# Patient Record
Sex: FEMALE | Race: WHITE | Employment: OTHER | URBAN - METROPOLITAN AREA
[De-identification: names, ages, dates, MRNs, and addresses within clinical notes are randomized per-mention and may not be internally consistent; named-entity substitution may affect disease eponyms.]

---

## 2018-07-17 DIAGNOSIS — F41.9 ANXIETY: ICD-10-CM

## 2018-07-17 RX ORDER — AMITRIPTYLINE HYDROCHLORIDE 25 MG/1
25 TABLET, FILM COATED ORAL 3 TIMES DAILY
Qty: 90 TABLET | Refills: 0 | Status: SHIPPED | OUTPATIENT
Start: 2018-07-17 | End: 2018-08-03 | Stop reason: SDUPTHER

## 2018-07-17 RX ORDER — BUSPIRONE HYDROCHLORIDE 10 MG/1
10 TABLET ORAL 3 TIMES DAILY
Qty: 90 TABLET | Refills: 0 | Status: SHIPPED | OUTPATIENT
Start: 2018-07-17 | End: 2018-08-03 | Stop reason: SDUPTHER

## 2018-07-17 NOTE — TELEPHONE ENCOUNTER
Patient's daughter contacted office asking for refill of Buspar 10 mg and Elavil 25 mg,patient has an appt scheduled for 8/1/18, advised a 30 day supply can be called in for patient.

## 2018-08-03 ENCOUNTER — OFFICE VISIT (OUTPATIENT)
Dept: FAMILY MEDICINE CLINIC | Age: 78
End: 2018-08-03
Payer: MEDICARE

## 2018-08-03 ENCOUNTER — HOSPITAL ENCOUNTER (OUTPATIENT)
Age: 78
Discharge: HOME | End: 2018-08-05
Payer: MEDICARE

## 2018-08-03 VITALS
DIASTOLIC BLOOD PRESSURE: 70 MMHG | RESPIRATION RATE: 18 BRPM | OXYGEN SATURATION: 97 % | SYSTOLIC BLOOD PRESSURE: 126 MMHG | BODY MASS INDEX: 23.46 KG/M2 | WEIGHT: 140.8 LBS | HEART RATE: 74 BPM | HEIGHT: 65 IN

## 2018-08-03 DIAGNOSIS — R53.83 FATIGUE, UNSPECIFIED TYPE: ICD-10-CM

## 2018-08-03 DIAGNOSIS — E78.2 MIXED HYPERLIPIDEMIA: ICD-10-CM

## 2018-08-03 DIAGNOSIS — R73.01 IFG (IMPAIRED FASTING GLUCOSE): ICD-10-CM

## 2018-08-03 DIAGNOSIS — Z00.00 ROUTINE GENERAL MEDICAL EXAMINATION AT A HEALTH CARE FACILITY: ICD-10-CM

## 2018-08-03 DIAGNOSIS — R41.3 MEMORY CHANGE: ICD-10-CM

## 2018-08-03 DIAGNOSIS — I10 ESSENTIAL HYPERTENSION: ICD-10-CM

## 2018-08-03 DIAGNOSIS — E03.9 ACQUIRED HYPOTHYROIDISM: ICD-10-CM

## 2018-08-03 DIAGNOSIS — Z00.00 MEDICARE ANNUAL WELLNESS VISIT, INITIAL: Primary | ICD-10-CM

## 2018-08-03 DIAGNOSIS — Z00.00 MEDICARE ANNUAL WELLNESS VISIT, INITIAL: ICD-10-CM

## 2018-08-03 DIAGNOSIS — F41.9 ANXIETY: ICD-10-CM

## 2018-08-03 LAB
ALBUMIN SERPL-MCNC: 4.5 G/DL (ref 3.5–5.2)
ALP BLD-CCNC: 90 U/L (ref 35–104)
ALT SERPL-CCNC: 10 U/L (ref 0–32)
ANION GAP SERPL CALCULATED.3IONS-SCNC: 13 MMOL/L (ref 7–16)
AST SERPL-CCNC: 24 U/L (ref 0–31)
BASOPHILS ABSOLUTE: 0.06 E9/L (ref 0–0.2)
BASOPHILS RELATIVE PERCENT: 0.8 % (ref 0–2)
BILIRUB SERPL-MCNC: 0.3 MG/DL (ref 0–1.2)
BUN BLDV-MCNC: 14 MG/DL (ref 8–23)
CALCIUM SERPL-MCNC: 10.3 MG/DL (ref 8.6–10.2)
CHLORIDE BLD-SCNC: 100 MMOL/L (ref 98–107)
CHOLESTEROL, TOTAL: 151 MG/DL (ref 0–199)
CO2: 28 MMOL/L (ref 22–29)
CREAT SERPL-MCNC: 0.7 MG/DL (ref 0.5–1)
EOSINOPHILS ABSOLUTE: 0.17 E9/L (ref 0.05–0.5)
EOSINOPHILS RELATIVE PERCENT: 2.2 % (ref 0–6)
GFR AFRICAN AMERICAN: >60
GFR NON-AFRICAN AMERICAN: >60 ML/MIN/1.73
GLUCOSE BLD-MCNC: 86 MG/DL (ref 74–109)
HBA1C MFR BLD: 5.7 % (ref 4–5.6)
HCT VFR BLD CALC: 40.2 % (ref 34–48)
HDLC SERPL-MCNC: 62 MG/DL
HEMOGLOBIN: 12.5 G/DL (ref 11.5–15.5)
IMMATURE GRANULOCYTES #: 0.02 E9/L
IMMATURE GRANULOCYTES %: 0.3 % (ref 0–5)
LDL CHOLESTEROL CALCULATED: 65 MG/DL (ref 0–99)
LYMPHOCYTES ABSOLUTE: 1.54 E9/L (ref 1.5–4)
LYMPHOCYTES RELATIVE PERCENT: 20.2 % (ref 20–42)
MCH RBC QN AUTO: 31 PG (ref 26–35)
MCHC RBC AUTO-ENTMCNC: 31.1 % (ref 32–34.5)
MCV RBC AUTO: 99.8 FL (ref 80–99.9)
MONOCYTES ABSOLUTE: 0.54 E9/L (ref 0.1–0.95)
MONOCYTES RELATIVE PERCENT: 7.1 % (ref 2–12)
NEUTROPHILS ABSOLUTE: 5.29 E9/L (ref 1.8–7.3)
NEUTROPHILS RELATIVE PERCENT: 69.4 % (ref 43–80)
PDW BLD-RTO: 12.6 FL (ref 11.5–15)
PLATELET # BLD: 236 E9/L (ref 130–450)
PMV BLD AUTO: 10.7 FL (ref 7–12)
POTASSIUM SERPL-SCNC: 4.5 MMOL/L (ref 3.5–5)
RBC # BLD: 4.03 E12/L (ref 3.5–5.5)
SODIUM BLD-SCNC: 141 MMOL/L (ref 132–146)
TOTAL PROTEIN: 7.1 G/DL (ref 6.4–8.3)
TRIGL SERPL-MCNC: 122 MG/DL (ref 0–149)
TSH SERPL DL<=0.05 MIU/L-ACNC: 2.65 UIU/ML (ref 0.27–4.2)
VLDLC SERPL CALC-MCNC: 24 MG/DL
WBC # BLD: 7.6 E9/L (ref 4.5–11.5)

## 2018-08-03 PROCEDURE — 85025 COMPLETE CBC W/AUTO DIFF WBC: CPT

## 2018-08-03 PROCEDURE — G0438 PPPS, INITIAL VISIT: HCPCS | Performed by: FAMILY MEDICINE

## 2018-08-03 PROCEDURE — 83036 HEMOGLOBIN GLYCOSYLATED A1C: CPT

## 2018-08-03 PROCEDURE — 80053 COMPREHEN METABOLIC PANEL: CPT

## 2018-08-03 PROCEDURE — 80061 LIPID PANEL: CPT

## 2018-08-03 PROCEDURE — 84443 ASSAY THYROID STIM HORMONE: CPT

## 2018-08-03 RX ORDER — AMITRIPTYLINE HYDROCHLORIDE 25 MG/1
25 TABLET, FILM COATED ORAL 3 TIMES DAILY
Qty: 270 TABLET | Refills: 1 | Status: SHIPPED | OUTPATIENT
Start: 2018-08-03 | End: 2018-08-20 | Stop reason: SDUPTHER

## 2018-08-03 RX ORDER — SPIRONOLACTONE 25 MG/1
TABLET ORAL
Qty: 90 TABLET | Refills: 1 | Status: SHIPPED | OUTPATIENT
Start: 2018-08-03 | End: 2018-08-20 | Stop reason: SDUPTHER

## 2018-08-03 RX ORDER — SIMVASTATIN 40 MG
40 TABLET ORAL NIGHTLY
Qty: 90 TABLET | Refills: 1 | Status: SHIPPED | OUTPATIENT
Start: 2018-08-03 | End: 2018-08-20 | Stop reason: SDUPTHER

## 2018-08-03 RX ORDER — BUSPIRONE HYDROCHLORIDE 10 MG/1
10 TABLET ORAL 3 TIMES DAILY
Qty: 270 TABLET | Refills: 1 | Status: SHIPPED | OUTPATIENT
Start: 2018-08-03 | End: 2018-08-20 | Stop reason: SDUPTHER

## 2018-08-03 RX ORDER — DONEPEZIL HYDROCHLORIDE 5 MG/1
5 TABLET, FILM COATED ORAL NIGHTLY
Qty: 90 TABLET | Refills: 1 | Status: SHIPPED | OUTPATIENT
Start: 2018-08-03 | End: 2018-08-20 | Stop reason: SDUPTHER

## 2018-08-03 RX ORDER — LEVOTHYROXINE SODIUM 0.07 MG/1
TABLET ORAL
Qty: 90 TABLET | Refills: 1 | Status: SHIPPED | OUTPATIENT
Start: 2018-08-03 | End: 2018-08-20 | Stop reason: SDUPTHER

## 2018-08-03 ASSESSMENT — LIFESTYLE VARIABLES
HOW OFTEN DURING THE LAST YEAR HAVE YOU FOUND THAT YOU WERE NOT ABLE TO STOP DRINKING ONCE YOU HAD STARTED: 0
HOW OFTEN DURING THE LAST YEAR HAVE YOU NEEDED AN ALCOHOLIC DRINK FIRST THING IN THE MORNING TO GET YOURSELF GOING AFTER A NIGHT OF HEAVY DRINKING: 0
HOW MANY STANDARD DRINKS CONTAINING ALCOHOL DO YOU HAVE ON A TYPICAL DAY: 0
HOW OFTEN DURING THE LAST YEAR HAVE YOU HAD A FEELING OF GUILT OR REMORSE AFTER DRINKING: 0
HOW OFTEN DURING THE LAST YEAR HAVE YOU FAILED TO DO WHAT WAS NORMALLY EXPECTED FROM YOU BECAUSE OF DRINKING: 0
HAVE YOU OR SOMEONE ELSE BEEN INJURED AS A RESULT OF YOUR DRINKING: 0
HOW OFTEN DURING THE LAST YEAR HAVE YOU BEEN UNABLE TO REMEMBER WHAT HAPPENED THE NIGHT BEFORE BECAUSE YOU HAD BEEN DRINKING: 0
AUDIT-C TOTAL SCORE: 2
HAS A RELATIVE, FRIEND, DOCTOR, OR ANOTHER HEALTH PROFESSIONAL EXPRESSED CONCERN ABOUT YOUR DRINKING OR SUGGESTED YOU CUT DOWN: 0
AUDIT TOTAL SCORE: 2
HOW OFTEN DO YOU HAVE A DRINK CONTAINING ALCOHOL: 1
HOW OFTEN DO YOU HAVE SIX OR MORE DRINKS ON ONE OCCASION: 1

## 2018-08-03 ASSESSMENT — ANXIETY QUESTIONNAIRES: GAD7 TOTAL SCORE: 2

## 2018-08-03 ASSESSMENT — PATIENT HEALTH QUESTIONNAIRE - PHQ9: SUM OF ALL RESPONSES TO PHQ QUESTIONS 1-9: 2

## 2018-08-03 NOTE — PROGRESS NOTES
Medicare Annual Wellness Visit  Name: Ray Hwang Date: 8/3/2018   MRN: <[id number]> Sex: Female   Age: 70 y.o. Ethnicity: Non-/Non    : 1940 Race: Caren Piper is here for Medicare AWV (AWV) and Medication Refill (Pt here today for medication refills- meds pended)    Screenings for behavioral, psychosocial and functional/safety risks, and cognitive dysfunction are all negative except as indicated below. These results, as well as other patient data from the [address] form, are documented in Flowsheets linked to this Encounter. No Known Allergies  Prior to Visit Medications    Medication Sig Taking? Authorizing Provider   amitriptyline (ELAVIL) 25 MG tablet Take 1 tablet by mouth 3 times daily Yes Dexter Mitchell Catterlin, DO   busPIRone (BUSPAR) 10 MG tablet Take 1 tablet by mouth 3 times daily Yes Dexter Liao, DO   donepezil (ARICEPT) 5 MG tablet Take 1 tablet by mouth nightly Yes Dexter Liao, DO   levothyroxine (SYNTHROID) 75 MCG tablet TAKE 1 TABLET BY MOUTH EVERY DAY Yes Dexter Liao, DO   simvastatin (ZOCOR) 40 MG tablet Take 1 tablet by mouth nightly Yes Rip Liao DO   spironolactone (ALDACTONE) 25 MG tablet TAKE 1 TABLET BY MOUTH EVERY DAY Yes Rip Liao, DO   Calcium Carbonate-Vitamin D (CALCIUM-VITAMIN D) 500-200 MG-UNIT per tablet Take 1 tablet by mouth daily Yes Historical Provider, MD   polycarbophil (FIBER-CAPS) 625 MG tablet Take 625 mg by mouth daily Yes Historical Provider, MD   vitamin E 1000 units capsule Take 1,000 Units by mouth daily Yes Historical Provider, MD   Diphenhydramine-APAP, sleep, (TYLENOL PM EXTRA STRENGTH PO) Take 2 tablets by mouth nightly as needed Yes Historical Provider, MD   Probiotic Product (PROBIOTIC DAILY PO) Take by mouth Yes Historical Provider, MD   aspirin EC 81 MG EC tablet Take 1 tablet by mouth daily.  Yes Faye Manitlla DO     Past Medical History: free of clutter?: Yes  Do you always fasten your seatbelt when you are in a car?: Yes  Safety Interventions:  · Home safety tips provided    ADL:  ADLs  In the past 7 days, did you need help from others to perform any of the following everyday activities?: None  In the past 7 days, did you need help from others to take care of any of the following?: (!) Banking/Finances, Transportation, Taking Medications  ADL Interventions:  · None indicated    Personalized Preventive Plan   Current Health Maintenance Status  Immunization History   Administered Date(s) Administered    Influenza, High Dose 10/21/2014, 10/19/2015    Influenza, Intradermal, Preservative free 11/29/2017    Influenza, Quadv, 3 yrs and older, IM, Preservative Free 11/04/2016    Pneumococcal 13-valent Conjugate ([id number]) 09/02/2015    Pneumococcal Polysaccharide ([id number]) 06/06/2014    Tdap (Boostrix, Adacel) 05/18/2016        Health Maintenance   Topic Date Due    Shingles Vaccine (1 of 2 - 2 Dose Series) 05/03/1992    Flu vaccine (1) 09/01/2018    TSH testing  11/29/2018    Potassium monitoring  11/29/2018    Creatinine monitoring  11/29/2018    DTaP/Tdap/Td vaccine (2 - Td) 05/18/2026    DEXA (modify frequency per FRAX score)  Addressed    Pneumococcal low/med risk  Completed     Recommendations for Preventive Services Due: see orders.   Recommended screening schedule for the next 5-10 years is provided to the patient in written form: see Patient Instructions/AVS.

## 2018-08-20 DIAGNOSIS — I10 ESSENTIAL HYPERTENSION: ICD-10-CM

## 2018-08-20 DIAGNOSIS — F41.9 ANXIETY: ICD-10-CM

## 2018-08-20 DIAGNOSIS — R41.3 MEMORY CHANGE: ICD-10-CM

## 2018-08-20 DIAGNOSIS — E78.2 MIXED HYPERLIPIDEMIA: ICD-10-CM

## 2018-08-20 DIAGNOSIS — E03.9 ACQUIRED HYPOTHYROIDISM: ICD-10-CM

## 2018-08-20 RX ORDER — SPIRONOLACTONE 25 MG/1
TABLET ORAL
Qty: 90 TABLET | Refills: 1 | Status: SHIPPED | OUTPATIENT
Start: 2018-08-20 | End: 2018-08-27

## 2018-08-20 RX ORDER — DONEPEZIL HYDROCHLORIDE 5 MG/1
5 TABLET, FILM COATED ORAL NIGHTLY
Qty: 90 TABLET | Refills: 1 | Status: SHIPPED | OUTPATIENT
Start: 2018-08-20 | End: 2018-08-27

## 2018-08-20 RX ORDER — SIMVASTATIN 40 MG
40 TABLET ORAL NIGHTLY
Qty: 90 TABLET | Refills: 1 | Status: SHIPPED | OUTPATIENT
Start: 2018-08-20 | End: 2018-08-27

## 2018-08-20 RX ORDER — BUSPIRONE HYDROCHLORIDE 10 MG/1
10 TABLET ORAL 3 TIMES DAILY
Qty: 270 TABLET | Refills: 1 | Status: SHIPPED | OUTPATIENT
Start: 2018-08-20 | End: 2019-02-14 | Stop reason: SDUPTHER

## 2018-08-20 RX ORDER — AMITRIPTYLINE HYDROCHLORIDE 25 MG/1
25 TABLET, FILM COATED ORAL 3 TIMES DAILY
Qty: 270 TABLET | Refills: 1 | Status: SHIPPED | OUTPATIENT
Start: 2018-08-20 | End: 2019-04-23 | Stop reason: SDUPTHER

## 2018-08-20 RX ORDER — LEVOTHYROXINE SODIUM 0.07 MG/1
TABLET ORAL
Qty: 90 TABLET | Refills: 1 | Status: SHIPPED | OUTPATIENT
Start: 2018-08-20 | End: 2018-08-27

## 2018-10-26 ENCOUNTER — NURSE ONLY (OUTPATIENT)
Dept: FAMILY MEDICINE CLINIC | Age: 78
End: 2018-10-26
Payer: MEDICARE

## 2018-10-26 DIAGNOSIS — Z23 NEED FOR PROPHYLACTIC VACCINATION AND INOCULATION AGAINST INFLUENZA: Primary | ICD-10-CM

## 2018-10-26 PROCEDURE — G0008 ADMIN INFLUENZA VIRUS VAC: HCPCS | Performed by: FAMILY MEDICINE

## 2018-10-26 PROCEDURE — 90662 IIV NO PRSV INCREASED AG IM: CPT | Performed by: FAMILY MEDICINE

## 2019-02-14 DIAGNOSIS — F41.9 ANXIETY: ICD-10-CM

## 2019-02-14 RX ORDER — BUSPIRONE HYDROCHLORIDE 10 MG/1
TABLET ORAL
Qty: 270 TABLET | Refills: 0 | Status: SHIPPED | OUTPATIENT
Start: 2019-02-14 | End: 2019-04-23 | Stop reason: SDUPTHER

## 2019-04-23 ENCOUNTER — OFFICE VISIT (OUTPATIENT)
Dept: FAMILY MEDICINE CLINIC | Age: 79
End: 2019-04-23
Payer: MEDICARE

## 2019-04-23 ENCOUNTER — HOSPITAL ENCOUNTER (OUTPATIENT)
Age: 79
Discharge: HOME | End: 2019-04-25
Payer: MEDICARE

## 2019-04-23 VITALS
OXYGEN SATURATION: 98 % | DIASTOLIC BLOOD PRESSURE: 66 MMHG | HEART RATE: 97 BPM | WEIGHT: 137.6 LBS | SYSTOLIC BLOOD PRESSURE: 118 MMHG | BODY MASS INDEX: 22.92 KG/M2 | HEIGHT: 65 IN

## 2019-04-23 DIAGNOSIS — F41.9 ANXIETY: ICD-10-CM

## 2019-04-23 DIAGNOSIS — E03.9 ACQUIRED HYPOTHYROIDISM: ICD-10-CM

## 2019-04-23 DIAGNOSIS — R41.3 MEMORY CHANGE: ICD-10-CM

## 2019-04-23 DIAGNOSIS — I10 ESSENTIAL HYPERTENSION: ICD-10-CM

## 2019-04-23 LAB
ALBUMIN SERPL-MCNC: 4.8 G/DL (ref 3.5–5.2)
ALP BLD-CCNC: 103 U/L (ref 35–104)
ALT SERPL-CCNC: 10 U/L (ref 0–32)
ANION GAP SERPL CALCULATED.3IONS-SCNC: 11 MMOL/L (ref 7–16)
AST SERPL-CCNC: 21 U/L (ref 0–31)
BILIRUB SERPL-MCNC: 0.4 MG/DL (ref 0–1.2)
BUN BLDV-MCNC: 14 MG/DL (ref 8–23)
CALCIUM SERPL-MCNC: 10.4 MG/DL (ref 8.6–10.2)
CHLORIDE BLD-SCNC: 103 MMOL/L (ref 98–107)
CO2: 27 MMOL/L (ref 22–29)
CREAT SERPL-MCNC: 0.7 MG/DL (ref 0.5–1)
GFR AFRICAN AMERICAN: >60
GFR NON-AFRICAN AMERICAN: >60 ML/MIN/1.73
GLUCOSE BLD-MCNC: 92 MG/DL (ref 74–99)
POTASSIUM SERPL-SCNC: 4.7 MMOL/L (ref 3.5–5)
SODIUM BLD-SCNC: 141 MMOL/L (ref 132–146)
TOTAL PROTEIN: 7.4 G/DL (ref 6.4–8.3)
TSH SERPL DL<=0.05 MIU/L-ACNC: 2.3 UIU/ML (ref 0.27–4.2)

## 2019-04-23 PROCEDURE — 84443 ASSAY THYROID STIM HORMONE: CPT

## 2019-04-23 PROCEDURE — 80053 COMPREHEN METABOLIC PANEL: CPT

## 2019-04-23 PROCEDURE — 99214 OFFICE O/P EST MOD 30 MIN: CPT | Performed by: FAMILY MEDICINE

## 2019-04-23 RX ORDER — BUSPIRONE HYDROCHLORIDE 10 MG/1
TABLET ORAL
Qty: 270 TABLET | Refills: 1 | Status: SHIPPED | OUTPATIENT
Start: 2019-04-23 | End: 2019-10-15 | Stop reason: SDUPTHER

## 2019-04-23 RX ORDER — LEVOTHYROXINE SODIUM 0.07 MG/1
TABLET ORAL
Qty: 90 TABLET | Refills: 1 | Status: SHIPPED | OUTPATIENT
Start: 2019-04-23 | End: 2019-10-15 | Stop reason: SDUPTHER

## 2019-04-23 RX ORDER — AMITRIPTYLINE HYDROCHLORIDE 25 MG/1
25 TABLET, FILM COATED ORAL 3 TIMES DAILY
Qty: 270 TABLET | Refills: 1 | Status: SHIPPED | OUTPATIENT
Start: 2019-04-23 | End: 2019-10-15 | Stop reason: SDUPTHER

## 2019-04-23 RX ORDER — SPIRONOLACTONE 25 MG/1
TABLET ORAL
Qty: 90 TABLET | Refills: 1 | Status: SHIPPED | OUTPATIENT
Start: 2019-04-23 | End: 2019-10-15 | Stop reason: SDUPTHER

## 2019-04-23 RX ORDER — DONEPEZIL HYDROCHLORIDE 10 MG/1
10 TABLET, FILM COATED ORAL NIGHTLY
Qty: 30 TABLET | Refills: 1 | Status: SHIPPED | OUTPATIENT
Start: 2019-04-23 | End: 2019-05-17 | Stop reason: SDUPTHER

## 2019-04-23 RX ORDER — SIMVASTATIN 40 MG
40 TABLET ORAL NIGHTLY
Qty: 90 TABLET | Refills: 1 | Status: SHIPPED | OUTPATIENT
Start: 2019-04-23 | End: 2019-10-15 | Stop reason: SDUPTHER

## 2019-04-23 ASSESSMENT — PATIENT HEALTH QUESTIONNAIRE - PHQ9
SUM OF ALL RESPONSES TO PHQ QUESTIONS 1-9: 0
SUM OF ALL RESPONSES TO PHQ9 QUESTIONS 1 & 2: 0
2. FEELING DOWN, DEPRESSED OR HOPELESS: 0
SUM OF ALL RESPONSES TO PHQ QUESTIONS 1-9: 0
1. LITTLE INTEREST OR PLEASURE IN DOING THINGS: 0

## 2019-04-23 NOTE — PROGRESS NOTES
[facility])  Family Medicine Outpatient        SUBJECTIVE:  CC: had concerns including Hypertension (patient here today for checkup and refill of medicine). HPI:Brigitte Kincaid presented to the clinic for a routine visit. Isabel Barrett is a 70year old female presenting for an established visit today. She is accompanied with her daughter. She is on Donepezil for memory changes Short term > long term. Her daughter reports she lives at home by herself and feels she is safe. She is able to perform her own ADL. Her big concern is that she feels like her mom's sleeping pattern is off. Isabel Barrett will call her daughter at 4 am to talk. She is using Tylenol prn prn for sleep and Isabel Barrett reports it doesn't help. Review of Systems  See hpi    Outpatient Medications Marked as Taking for the 4/23/19 encounter (Office Visit) with Brayan Braden MD   Medication Sig Dispense Refill    busPIRone (BUSPAR) 10 MG tablet TAKE 1 TABLET BY MOUTH THREE TIMES DAILY 270 tablet 1    levothyroxine (SYNTHROID) 75 MCG tablet TAKE 1 TABLET BY MOUTH EVERY DAY 90 tablet 1    spironolactone (ALDACTONE) 25 MG tablet TAKE 1 TABLET BY MOUTH EVERY DAY 90 tablet 1    donepezil (ARICEPT) 10 MG tablet Take 1 tablet by mouth nightly 30 tablet 1    amitriptyline (ELAVIL) 25 MG tablet Take 1 tablet by mouth 3 times daily 270 tablet 1    simvastatin (ZOCOR) 40 MG tablet Take 1 tablet by mouth nightly 90 tablet 1    Calcium Carbonate-Vitamin D (CALCIUM-VITAMIN D) 500-200 MG-UNIT per tablet Take 1 tablet by mouth daily      polycarbophil (FIBER-CAPS) 625 MG tablet Take 625 mg by mouth daily      vitamin E 1000 units capsule Take 1,000 Units by mouth daily      Diphenhydramine-APAP, sleep, (TYLENOL PM EXTRA STRENGTH PO) Take 2 tablets by mouth nightly as needed      Probiotic Product (PROBIOTIC DAILY PO) Take by mouth      aspirin EC 81 MG EC tablet Take 1 tablet by mouth daily.  30 tablet 3       I have reviewed all pertinent PMHx, PSHx, FamHx, SocialHx, medications, and allergies and updated history as appropriate. OBJECTIVE    VS: /66   Pulse 97   Ht 5' 5\" (1.651 m)   Wt 137 lb 9.6 oz (62.4 kg)   SpO2 98%   BMI 22.90 kg/m²   Physical Exam   Constitutional: She is oriented to person, place, and time. She appears well-developed. No distress. Sweet elderly women   HENT:   Head: Normocephalic and atraumatic. Eyes: Pupils are equal, round, and reactive to light. EOM are normal.   Cardiovascular: Normal rate and regular rhythm. Pulmonary/Chest: Effort normal and breath sounds normal.   Abdominal: Soft. She exhibits no distension. There is no tenderness. Musculoskeletal: Normal range of motion. She exhibits no tenderness. Neurological: She is alert and oriented to person, place, and time. Skin: Skin is warm and dry. She is not diaphoretic. ASSESSMENT/PLAN:  1. Anxiety  Stable; continue current medication regimen.   - busPIRone (BUSPAR) 10 MG tablet; TAKE 1 TABLET BY MOUTH THREE TIMES DAILY  Dispense: 270 tablet; Refill: 1  - amitriptyline (ELAVIL) 25 MG tablet; Take 1 tablet by mouth 3 times daily  Dispense: 270 tablet; Refill: 1    2. Acquired hypothyroidism  Historic; continue Synthroid 75 mcg/qd. Repeat TSH at this time. - levothyroxine (SYNTHROID) 75 MCG tablet; TAKE 1 TABLET BY MOUTH EVERY DAY  Dispense: 90 tablet; Refill: 1  - TSH without Reflex; Future    3. Essential hypertension  Stable; continue current medication regimen as prescribed. - spironolactone (ALDACTONE) 25 MG tablet; TAKE 1 TABLET BY MOUTH EVERY DAY  Dispense: 90 tablet; Refill: 1  - Comprehensive Metabolic Panel; Future    4. Memory change  Titrate up Donepezil at this time and rtc in 1 m or prior for follow up. All questions answered. No previous MRI brain seen in chart. Consider in the future prn.   - donepezil (ARICEPT) 10 MG tablet; Take 1 tablet by mouth nightly  Dispense: 30 tablet;  Refill: 1      I have reviewed my findings and recommendations with Skye Fry MD  5/11/2019 9:17 AM      Please note this report has been partially produced using speech recognition software  and may contain errors related to that system including grammar, punctuation and spelling as well as words and phrases that may seem inappropriate. If there are questions or concerns please feel free to contact me to clarify.

## 2019-05-02 DIAGNOSIS — E83.52 SERUM CALCIUM ELEVATED: Primary | ICD-10-CM

## 2019-07-02 ENCOUNTER — HOSPITAL ENCOUNTER (EMERGENCY)
Age: 79
Discharge: HOME | End: 2019-07-02
Attending: EMERGENCY MEDICINE
Payer: MEDICARE

## 2019-07-02 VITALS
DIASTOLIC BLOOD PRESSURE: 67 MMHG | HEART RATE: 89 BPM | SYSTOLIC BLOOD PRESSURE: 127 MMHG | BODY MASS INDEX: 22.82 KG/M2 | WEIGHT: 137.13 LBS | RESPIRATION RATE: 20 BRPM | TEMPERATURE: 98 F | OXYGEN SATURATION: 99 %

## 2019-07-02 DIAGNOSIS — R19.7 DIARRHEA, UNSPECIFIED TYPE: ICD-10-CM

## 2019-07-02 DIAGNOSIS — R11.0 NAUSEA: Primary | ICD-10-CM

## 2019-07-02 LAB
ALBUMIN SERPL-MCNC: 4.5 G/DL (ref 3.5–5.2)
ALP BLD-CCNC: 119 U/L (ref 35–104)
ALT SERPL-CCNC: 12 U/L (ref 0–32)
ANION GAP SERPL CALCULATED.3IONS-SCNC: 12 MMOL/L (ref 7–16)
AST SERPL-CCNC: 20 U/L (ref 0–31)
BACTERIA: ABNORMAL /HPF
BASOPHILS ABSOLUTE: 0.05 E9/L (ref 0–0.2)
BASOPHILS RELATIVE PERCENT: 0.5 % (ref 0–2)
BILIRUB SERPL-MCNC: 0.5 MG/DL (ref 0–1.2)
BILIRUBIN URINE: NEGATIVE
BLOOD, URINE: NEGATIVE
BUN BLDV-MCNC: 14 MG/DL (ref 8–23)
CALCIUM SERPL-MCNC: 10.3 MG/DL (ref 8.6–10.2)
CHLORIDE BLD-SCNC: 104 MMOL/L (ref 98–107)
CLARITY: ABNORMAL
CO2: 26 MMOL/L (ref 22–29)
COLOR: YELLOW
CREAT SERPL-MCNC: 0.6 MG/DL (ref 0.5–1)
EOSINOPHILS ABSOLUTE: 0.05 E9/L (ref 0.05–0.5)
EOSINOPHILS RELATIVE PERCENT: 0.5 % (ref 0–6)
EPITHELIAL CELLS, UA: ABNORMAL /HPF
GFR AFRICAN AMERICAN: >60
GFR NON-AFRICAN AMERICAN: >60 ML/MIN/1.73
GLUCOSE BLD-MCNC: 103 MG/DL (ref 74–99)
GLUCOSE URINE: NEGATIVE MG/DL
HCT VFR BLD CALC: 39.9 % (ref 34–48)
HEMOGLOBIN: 12.7 G/DL (ref 11.5–15.5)
IMMATURE GRANULOCYTES #: 0.03 E9/L
IMMATURE GRANULOCYTES %: 0.3 % (ref 0–5)
KETONES, URINE: 40 MG/DL
LACTIC ACID: 1.5 MMOL/L (ref 0.5–2.2)
LEUKOCYTE ESTERASE, URINE: ABNORMAL
LYMPHOCYTES ABSOLUTE: 1.07 E9/L (ref 1.5–4)
LYMPHOCYTES RELATIVE PERCENT: 11.2 % (ref 20–42)
MCH RBC QN AUTO: 30.5 PG (ref 26–35)
MCHC RBC AUTO-ENTMCNC: 31.8 % (ref 32–34.5)
MCV RBC AUTO: 95.9 FL (ref 80–99.9)
MONOCYTES ABSOLUTE: 0.67 E9/L (ref 0.1–0.95)
MONOCYTES RELATIVE PERCENT: 7 % (ref 2–12)
NEUTROPHILS ABSOLUTE: 7.68 E9/L (ref 1.8–7.3)
NEUTROPHILS RELATIVE PERCENT: 80.5 % (ref 43–80)
NITRITE, URINE: NEGATIVE
PDW BLD-RTO: 12.5 FL (ref 11.5–15)
PH UA: 6 (ref 5–9)
PLATELET # BLD: 227 E9/L (ref 130–450)
PMV BLD AUTO: 10.1 FL (ref 7–12)
POTASSIUM SERPL-SCNC: 4.1 MMOL/L (ref 3.5–5)
PROTEIN UA: NEGATIVE MG/DL
RBC # BLD: 4.16 E12/L (ref 3.5–5.5)
RBC UA: ABNORMAL /HPF (ref 0–2)
SODIUM BLD-SCNC: 142 MMOL/L (ref 132–146)
SPECIFIC GRAVITY UA: 1.02 (ref 1–1.03)
TOTAL PROTEIN: 7.3 G/DL (ref 6.4–8.3)
TROPONIN: <0.01 NG/ML (ref 0–0.03)
UROBILINOGEN, URINE: 0.2 E.U./DL
WBC # BLD: 9.6 E9/L (ref 4.5–11.5)
WBC UA: ABNORMAL /HPF (ref 0–5)

## 2019-07-02 PROCEDURE — 80053 COMPREHEN METABOLIC PANEL: CPT

## 2019-07-02 PROCEDURE — 2580000003 HC RX 258: Performed by: PHYSICIAN ASSISTANT

## 2019-07-02 PROCEDURE — 96374 THER/PROPH/DIAG INJ IV PUSH: CPT

## 2019-07-02 PROCEDURE — 36415 COLL VENOUS BLD VENIPUNCTURE: CPT

## 2019-07-02 PROCEDURE — 84484 ASSAY OF TROPONIN QUANT: CPT

## 2019-07-02 PROCEDURE — 93005 ELECTROCARDIOGRAM TRACING: CPT | Performed by: PHYSICIAN ASSISTANT

## 2019-07-02 PROCEDURE — 81001 URINALYSIS AUTO W/SCOPE: CPT

## 2019-07-02 PROCEDURE — 85025 COMPLETE CBC W/AUTO DIFF WBC: CPT

## 2019-07-02 PROCEDURE — 99284 EMERGENCY DEPT VISIT MOD MDM: CPT

## 2019-07-02 PROCEDURE — 6360000002 HC RX W HCPCS: Performed by: PHYSICIAN ASSISTANT

## 2019-07-02 PROCEDURE — 83605 ASSAY OF LACTIC ACID: CPT

## 2019-07-02 RX ORDER — ONDANSETRON 2 MG/ML
4 INJECTION INTRAMUSCULAR; INTRAVENOUS ONCE
Status: COMPLETED | OUTPATIENT
Start: 2019-07-02 | End: 2019-07-02

## 2019-07-02 RX ORDER — 0.9 % SODIUM CHLORIDE 0.9 %
1000 INTRAVENOUS SOLUTION INTRAVENOUS ONCE
Status: COMPLETED | OUTPATIENT
Start: 2019-07-02 | End: 2019-07-02

## 2019-07-02 RX ORDER — ONDANSETRON 4 MG/1
4 TABLET, ORALLY DISINTEGRATING ORAL EVERY 8 HOURS PRN
Qty: 10 TABLET | Refills: 0 | Status: SHIPPED | OUTPATIENT
Start: 2019-07-02 | End: 2019-09-23 | Stop reason: ALTCHOICE

## 2019-07-02 RX ADMIN — ONDANSETRON 4 MG: 2 INJECTION INTRAMUSCULAR; INTRAVENOUS at 19:04

## 2019-07-02 RX ADMIN — SODIUM CHLORIDE 1000 ML: 9 INJECTION, SOLUTION INTRAVENOUS at 19:04

## 2019-07-02 NOTE — ED PROVIDER NOTES
sodium chloride bolus (0 mLs Intravenous Stopped 7/2/19 1940)   ondansetron (ZOFRAN) injection 4 mg (4 mg Intravenous Given 7/2/19 1904)         ED COURSE:     EKG # 1  Interpreted by emergency department physician unless otherwise noted. Time:  1841   Rate: 82  Rhythm: Sinus. Interpretation: normal sinus rhythm. No previous EKG for comparison. Medical Decision Making:    Patient is brought in with complaint of general weakness chronic diarrhea. Daughter states she has had history of diarrhea with intermittent bloody stool secondary to hemorrhoids. Patient denied any abdominal pain. She denied any chest pain palpitation diaphoresis. She states she felt lightheaded orthostatics were positive for increased heart rate she had been given a liter of fluid. Guaiac stool was negative she had hemorrhoids present. Normal labs. Patient was discharged home to follow-up with primary care. Counseling: The emergency provider has spoken with the patient and discussed todays results, in addition to providing specific details for the plan of care and counseling regarding the diagnosis and prognosis. Questions are answered at this time and they are agreeable with the plan.      --------------------------------- IMPRESSION AND DISPOSITION ---------------------------------    IMPRESSION  1. Nausea    2. Diarrhea, unspecified type        DISPOSITION  Disposition: Discharge to home  Patient condition is good      NOTE: This report was transcribed using voice recognition software.  Every effort was made to ensure accuracy; however, inadvertent computerized transcription errors may be present     [place], [place]  07/02/19 2183

## 2019-07-03 ENCOUNTER — TELEPHONE (OUTPATIENT)
Dept: ADMINISTRATIVE | Age: 79
End: 2019-07-03

## 2019-07-03 LAB
EKG ATRIAL RATE: 82 BPM
EKG P AXIS: 65 DEGREES
EKG P-R INTERVAL: 164 MS
EKG Q-T INTERVAL: 398 MS
EKG QRS DURATION: 80 MS
EKG QTC CALCULATION (BAZETT): 464 MS
EKG R AXIS: 4 DEGREES
EKG T AXIS: 31 DEGREES
EKG VENTRICULAR RATE: 82 BPM

## 2019-07-03 PROCEDURE — 93010 ELECTROCARDIOGRAM REPORT: CPT | Performed by: INTERNAL MEDICINE

## 2019-07-23 DIAGNOSIS — R41.3 MEMORY CHANGE: ICD-10-CM

## 2019-07-23 RX ORDER — DONEPEZIL HYDROCHLORIDE 10 MG/1
TABLET, FILM COATED ORAL
Qty: 90 TABLET | Refills: 0 | Status: SHIPPED | OUTPATIENT
Start: 2019-07-23 | End: 2019-10-19 | Stop reason: SDUPTHER

## 2019-09-23 ENCOUNTER — OFFICE VISIT (OUTPATIENT)
Dept: FAMILY MEDICINE CLINIC | Age: 79
End: 2019-09-23
Payer: MEDICARE

## 2019-09-23 VITALS
WEIGHT: 134.4 LBS | BODY MASS INDEX: 22.39 KG/M2 | HEIGHT: 65 IN | DIASTOLIC BLOOD PRESSURE: 60 MMHG | TEMPERATURE: 98.4 F | RESPIRATION RATE: 18 BRPM | OXYGEN SATURATION: 96 % | SYSTOLIC BLOOD PRESSURE: 122 MMHG | HEART RATE: 79 BPM

## 2019-09-23 DIAGNOSIS — Z00.00 ROUTINE GENERAL MEDICAL EXAMINATION AT A HEALTH CARE FACILITY: ICD-10-CM

## 2019-09-23 DIAGNOSIS — E78.5 DYSLIPIDEMIA: ICD-10-CM

## 2019-09-23 DIAGNOSIS — Z00.00 ENCOUNTER FOR MEDICARE ANNUAL WELLNESS EXAM: Primary | ICD-10-CM

## 2019-09-23 DIAGNOSIS — F03.90 DEMENTIA WITHOUT BEHAVIORAL DISTURBANCE, UNSPECIFIED DEMENTIA TYPE: ICD-10-CM

## 2019-09-23 DIAGNOSIS — R73.01 IFG (IMPAIRED FASTING GLUCOSE): ICD-10-CM

## 2019-09-23 DIAGNOSIS — R53.83 FATIGUE, UNSPECIFIED TYPE: ICD-10-CM

## 2019-09-23 PROCEDURE — G0439 PPPS, SUBSEQ VISIT: HCPCS | Performed by: FAMILY MEDICINE

## 2019-09-23 RX ORDER — MEMANTINE HYDROCHLORIDE 7 MG/1
7 CAPSULE, EXTENDED RELEASE ORAL DAILY
Qty: 30 CAPSULE | Refills: 3 | Status: SHIPPED | OUTPATIENT
Start: 2019-09-23 | End: 2019-11-18

## 2019-09-23 ASSESSMENT — PATIENT HEALTH QUESTIONNAIRE - PHQ9
SUM OF ALL RESPONSES TO PHQ QUESTIONS 1-9: 2
SUM OF ALL RESPONSES TO PHQ QUESTIONS 1-9: 2

## 2019-09-23 ASSESSMENT — LIFESTYLE VARIABLES
HOW OFTEN DURING THE LAST YEAR HAVE YOU NEEDED AN ALCOHOLIC DRINK FIRST THING IN THE MORNING TO GET YOURSELF GOING AFTER A NIGHT OF HEAVY DRINKING: 0
HOW OFTEN DO YOU HAVE SIX OR MORE DRINKS ON ONE OCCASION: 0
HOW OFTEN DURING THE LAST YEAR HAVE YOU FAILED TO DO WHAT WAS NORMALLY EXPECTED FROM YOU BECAUSE OF DRINKING: 0
HOW OFTEN DURING THE LAST YEAR HAVE YOU FOUND THAT YOU WERE NOT ABLE TO STOP DRINKING ONCE YOU HAD STARTED: 0
HOW OFTEN DURING THE LAST YEAR HAVE YOU HAD A FEELING OF GUILT OR REMORSE AFTER DRINKING: 0
HOW OFTEN DO YOU HAVE A DRINK CONTAINING ALCOHOL: 1
AUDIT TOTAL SCORE: 1
HOW MANY STANDARD DRINKS CONTAINING ALCOHOL DO YOU HAVE ON A TYPICAL DAY: 0
HAVE YOU OR SOMEONE ELSE BEEN INJURED AS A RESULT OF YOUR DRINKING: 0
AUDIT-C TOTAL SCORE: 1
HOW OFTEN DURING THE LAST YEAR HAVE YOU BEEN UNABLE TO REMEMBER WHAT HAPPENED THE NIGHT BEFORE BECAUSE YOU HAD BEEN DRINKING: 0
HAS A RELATIVE, FRIEND, DOCTOR, OR ANOTHER HEALTH PROFESSIONAL EXPRESSED CONCERN ABOUT YOUR DRINKING OR SUGGESTED YOU CUT DOWN: 0

## 2019-10-19 DIAGNOSIS — R41.3 MEMORY CHANGE: ICD-10-CM

## 2019-10-21 RX ORDER — DONEPEZIL HYDROCHLORIDE 10 MG/1
TABLET, FILM COATED ORAL
Qty: 90 TABLET | Refills: 1 | Status: SHIPPED
Start: 2019-10-21 | End: 2020-04-15

## 2019-11-18 ENCOUNTER — HOSPITAL ENCOUNTER (OUTPATIENT)
Age: 79
Discharge: HOME | End: 2019-11-20
Payer: MEDICARE

## 2019-11-18 ENCOUNTER — OFFICE VISIT (OUTPATIENT)
Dept: FAMILY MEDICINE CLINIC | Age: 79
End: 2019-11-18
Payer: MEDICARE

## 2019-11-18 VITALS
WEIGHT: 130 LBS | SYSTOLIC BLOOD PRESSURE: 124 MMHG | TEMPERATURE: 98.3 F | HEIGHT: 65 IN | OXYGEN SATURATION: 98 % | DIASTOLIC BLOOD PRESSURE: 70 MMHG | RESPIRATION RATE: 18 BRPM | BODY MASS INDEX: 21.66 KG/M2 | HEART RATE: 103 BPM

## 2019-11-18 DIAGNOSIS — E03.9 ACQUIRED HYPOTHYROIDISM: ICD-10-CM

## 2019-11-18 DIAGNOSIS — F02.80 LATE ONSET ALZHEIMER'S DISEASE WITHOUT BEHAVIORAL DISTURBANCE (HCC): ICD-10-CM

## 2019-11-18 DIAGNOSIS — G62.9 NEUROPATHY: ICD-10-CM

## 2019-11-18 DIAGNOSIS — R53.83 FATIGUE, UNSPECIFIED TYPE: ICD-10-CM

## 2019-11-18 DIAGNOSIS — E78.5 DYSLIPIDEMIA: ICD-10-CM

## 2019-11-18 DIAGNOSIS — E78.2 MIXED HYPERLIPIDEMIA: ICD-10-CM

## 2019-11-18 DIAGNOSIS — Z23 IMMUNIZATION DUE: ICD-10-CM

## 2019-11-18 DIAGNOSIS — R73.01 IFG (IMPAIRED FASTING GLUCOSE): ICD-10-CM

## 2019-11-18 DIAGNOSIS — I10 ESSENTIAL HYPERTENSION: Primary | ICD-10-CM

## 2019-11-18 DIAGNOSIS — Z00.00 ENCOUNTER FOR MEDICARE ANNUAL WELLNESS EXAM: ICD-10-CM

## 2019-11-18 DIAGNOSIS — G30.1 LATE ONSET ALZHEIMER'S DISEASE WITHOUT BEHAVIORAL DISTURBANCE (HCC): ICD-10-CM

## 2019-11-18 PROCEDURE — 99214 OFFICE O/P EST MOD 30 MIN: CPT | Performed by: FAMILY MEDICINE

## 2019-11-18 PROCEDURE — 80061 LIPID PANEL: CPT

## 2019-11-18 PROCEDURE — 83036 HEMOGLOBIN GLYCOSYLATED A1C: CPT

## 2019-11-18 PROCEDURE — G0008 ADMIN INFLUENZA VIRUS VAC: HCPCS | Performed by: FAMILY MEDICINE

## 2019-11-18 PROCEDURE — 84443 ASSAY THYROID STIM HORMONE: CPT

## 2019-11-18 PROCEDURE — 85025 COMPLETE CBC W/AUTO DIFF WBC: CPT

## 2019-11-18 PROCEDURE — 90653 IIV ADJUVANT VACCINE IM: CPT | Performed by: FAMILY MEDICINE

## 2019-11-18 PROCEDURE — 80048 BASIC METABOLIC PNL TOTAL CA: CPT

## 2019-11-18 ASSESSMENT — ENCOUNTER SYMPTOMS
COUGH: 0
FACIAL SWELLING: 0
PHOTOPHOBIA: 0
ABDOMINAL DISTENTION: 0
STRIDOR: 0
CHOKING: 0
ANAL BLEEDING: 0
CONSTIPATION: 0
DIARRHEA: 0
WHEEZING: 0
TROUBLE SWALLOWING: 0
EYE DISCHARGE: 0
RHINORRHEA: 0
SINUS PRESSURE: 0
EYE ITCHING: 0
BACK PAIN: 0
COLOR CHANGE: 0
EYE PAIN: 0
ABDOMINAL PAIN: 0
APNEA: 0
BLOOD IN STOOL: 0
RESPIRATORY NEGATIVE: 1
GASTROINTESTINAL NEGATIVE: 1
SINUS PAIN: 0
VOICE CHANGE: 0
SHORTNESS OF BREATH: 0
NAUSEA: 0
EYE REDNESS: 0
ALLERGIC/IMMUNOLOGIC NEGATIVE: 1
VOMITING: 0
SORE THROAT: 0
RECTAL PAIN: 0
CHEST TIGHTNESS: 0

## 2019-11-19 LAB
ANION GAP SERPL CALCULATED.3IONS-SCNC: 13 MMOL/L (ref 7–16)
BASOPHILS ABSOLUTE: 0.06 E9/L (ref 0–0.2)
BASOPHILS RELATIVE PERCENT: 0.7 % (ref 0–2)
BUN BLDV-MCNC: 11 MG/DL (ref 8–23)
CALCIUM SERPL-MCNC: 9.6 MG/DL (ref 8.6–10.2)
CHLORIDE BLD-SCNC: 104 MMOL/L (ref 98–107)
CHOLESTEROL, TOTAL: 135 MG/DL (ref 0–199)
CO2: 28 MMOL/L (ref 22–29)
CREAT SERPL-MCNC: 0.6 MG/DL (ref 0.5–1)
EOSINOPHILS ABSOLUTE: 0.16 E9/L (ref 0.05–0.5)
EOSINOPHILS RELATIVE PERCENT: 2 % (ref 0–6)
GFR AFRICAN AMERICAN: >60
GFR NON-AFRICAN AMERICAN: >60 ML/MIN/1.73
GLUCOSE BLD-MCNC: 88 MG/DL (ref 74–99)
HBA1C MFR BLD: 5.7 % (ref 4–5.6)
HCT VFR BLD CALC: 41.9 % (ref 34–48)
HDLC SERPL-MCNC: 67 MG/DL
HEMOGLOBIN: 12.6 G/DL (ref 11.5–15.5)
IMMATURE GRANULOCYTES #: 0.02 E9/L
IMMATURE GRANULOCYTES %: 0.2 % (ref 0–5)
LDL CHOLESTEROL CALCULATED: 36 MG/DL (ref 0–99)
LYMPHOCYTES ABSOLUTE: 1.51 E9/L (ref 1.5–4)
LYMPHOCYTES RELATIVE PERCENT: 18.5 % (ref 20–42)
MCH RBC QN AUTO: 29.6 PG (ref 26–35)
MCHC RBC AUTO-ENTMCNC: 30.1 % (ref 32–34.5)
MCV RBC AUTO: 98.6 FL (ref 80–99.9)
MONOCYTES ABSOLUTE: 0.58 E9/L (ref 0.1–0.95)
MONOCYTES RELATIVE PERCENT: 7.1 % (ref 2–12)
NEUTROPHILS ABSOLUTE: 5.84 E9/L (ref 1.8–7.3)
NEUTROPHILS RELATIVE PERCENT: 71.5 % (ref 43–80)
PDW BLD-RTO: 13.1 FL (ref 11.5–15)
PLATELET # BLD: 229 E9/L (ref 130–450)
PMV BLD AUTO: 11.5 FL (ref 7–12)
POTASSIUM SERPL-SCNC: 3.5 MMOL/L (ref 3.5–5)
RBC # BLD: 4.25 E12/L (ref 3.5–5.5)
SODIUM BLD-SCNC: 145 MMOL/L (ref 132–146)
TRIGL SERPL-MCNC: 159 MG/DL (ref 0–149)
TSH SERPL DL<=0.05 MIU/L-ACNC: 1.28 UIU/ML (ref 0.27–4.2)
VLDLC SERPL CALC-MCNC: 32 MG/DL
WBC # BLD: 8.2 E9/L (ref 4.5–11.5)

## 2019-11-19 ASSESSMENT — ENCOUNTER SYMPTOMS
ORTHOPNEA: 0
BLURRED VISION: 0

## 2020-01-08 ENCOUNTER — OFFICE VISIT (OUTPATIENT)
Dept: FAMILY MEDICINE CLINIC | Age: 80
End: 2020-01-08
Payer: MEDICARE

## 2020-01-08 VITALS
SYSTOLIC BLOOD PRESSURE: 110 MMHG | BODY MASS INDEX: 20.49 KG/M2 | HEART RATE: 60 BPM | DIASTOLIC BLOOD PRESSURE: 68 MMHG | WEIGHT: 123 LBS | OXYGEN SATURATION: 98 % | HEIGHT: 65 IN | TEMPERATURE: 98.3 F | RESPIRATION RATE: 18 BRPM

## 2020-01-08 PROCEDURE — 99214 OFFICE O/P EST MOD 30 MIN: CPT | Performed by: FAMILY MEDICINE

## 2020-01-08 RX ORDER — ACETAMINOPHEN 325 MG/1
650 TABLET ORAL EVERY 6 HOURS PRN
COMMUNITY

## 2020-01-08 ASSESSMENT — ENCOUNTER SYMPTOMS
DIARRHEA: 0
RECTAL PAIN: 0
ANAL BLEEDING: 0
SORE THROAT: 0
NAUSEA: 0
ALLERGIC/IMMUNOLOGIC NEGATIVE: 1
GASTROINTESTINAL NEGATIVE: 1
VOMITING: 0
CHOKING: 0
COLOR CHANGE: 0
VOICE CHANGE: 0
CHEST TIGHTNESS: 0
EYE REDNESS: 0
FACIAL SWELLING: 0
APNEA: 0
ORTHOPNEA: 0
TROUBLE SWALLOWING: 0
BLURRED VISION: 0
EYE ITCHING: 0
COUGH: 0
WHEEZING: 0
PHOTOPHOBIA: 0
BACK PAIN: 0
SHORTNESS OF BREATH: 0
SINUS PAIN: 0
RESPIRATORY NEGATIVE: 1
ABDOMINAL PAIN: 0
SINUS PRESSURE: 0
CONSTIPATION: 0
BLOOD IN STOOL: 0
STRIDOR: 0
EYE DISCHARGE: 0
RHINORRHEA: 0
ABDOMINAL DISTENTION: 0
EYE PAIN: 0

## 2020-01-08 ASSESSMENT — PATIENT HEALTH QUESTIONNAIRE - PHQ9
2. FEELING DOWN, DEPRESSED OR HOPELESS: 0
SUM OF ALL RESPONSES TO PHQ QUESTIONS 1-9: 0
1. LITTLE INTEREST OR PLEASURE IN DOING THINGS: 0
SUM OF ALL RESPONSES TO PHQ QUESTIONS 1-9: 0
SUM OF ALL RESPONSES TO PHQ9 QUESTIONS 1 & 2: 0

## 2020-01-08 NOTE — PROGRESS NOTES
hyperparathyroidism, a hypertension causing med, pheochromocytoma, renovascular disease or sleep apnea. ROS:  Review of Systems   Constitutional: Positive for fatigue and malaise/fatigue. Negative for activity change, appetite change, chills, diaphoresis, fever and unexpected weight change. HENT: Negative. Negative for congestion, dental problem, drooling, ear discharge, ear pain, facial swelling, hearing loss, mouth sores, nosebleeds, postnasal drip, rhinorrhea, sinus pressure, sinus pain, sneezing, sore throat, tinnitus, trouble swallowing and voice change. Eyes: Negative for blurred vision, photophobia, pain, discharge, redness, itching and visual disturbance. Respiratory: Negative. Negative for apnea, cough, choking, chest tightness, shortness of breath, wheezing and stridor. Cardiovascular: Negative. Negative for chest pain, palpitations, orthopnea, leg swelling and PND. Gastrointestinal: Negative. Negative for abdominal distention, abdominal pain, anal bleeding, blood in stool, constipation, diarrhea, nausea, rectal pain and vomiting. Endocrine: Negative. Negative for cold intolerance, heat intolerance, polydipsia, polyphagia and polyuria. Genitourinary: Negative. Negative for decreased urine volume, difficulty urinating, dysuria, enuresis, flank pain, frequency, genital sores, hematuria and urgency. Musculoskeletal: Positive for arthralgias, gait problem and myalgias. Negative for back pain, joint swelling, neck pain and neck stiffness. Skin: Negative. Negative for color change, pallor, rash and wound. Allergic/Immunologic: Negative. Negative for environmental allergies, food allergies and immunocompromised state. Neurological: Positive for weakness and numbness. Negative for dizziness, tremors, focal weakness, seizures, syncope, facial asymmetry, speech difficulty, light-headedness and headaches. Hematological: Negative. Negative for adenopathy.  Does not bruise/bleed easily. Psychiatric/Behavioral: Positive for confusion, decreased concentration, dysphoric mood and sleep disturbance. Negative for agitation, behavioral problems, hallucinations, self-injury and suicidal ideas. The patient is nervous/anxious. The patient is not hyperactive. Past Medical/Surgical Hx;  Reviewed with patient      Diagnosis Date    Hyperlipidemia     Hypertension      Past Surgical History:   Procedure Laterality Date    COLONOSCOPY      ENDOSCOPY, COLON, DIAGNOSTIC      HERNIA REPAIR Left 2015    femoral    HYSTERECTOMY      JOINT REPLACEMENT Bilateral        Past Family Hx:  Reviewed with patient  History reviewed. No pertinent family history. Social Hx:  Reviewed with patient  Social History     Tobacco Use    Smoking status: Former Smoker     Packs/day: 0.50     Years: 31.00     Pack years: 15.50     Types: Cigarettes     Start date: 1961     Last attempt to quit: 8/3/1992     Years since quittin.4    Smokeless tobacco: Never Used   Substance Use Topics    Alcohol use: Not Currently     Alcohol/week: 0.0 standard drinks       OBJECTIVE  /68   Pulse 60   Temp 98.3 °F (36.8 °C) (Temporal)   Resp 18   Ht 5' 5\" (1.651 m)   Wt 123 lb (55.8 kg)   LMP  (LMP Unknown)   SpO2 98%   Breastfeeding? No   BMI 20.47 kg/m²     Problem List:  Nani Campuzano does not have any pertinent problems on file. PHYS EX:  Physical Exam  Vitals signs and nursing note reviewed. Constitutional:       General: She is not in acute distress. Appearance: Normal appearance. She is well-developed. She is not ill-appearing, toxic-appearing or diaphoretic. HENT:      Head: Normocephalic and atraumatic. Right Ear: Tympanic membrane, ear canal and external ear normal. There is no impacted cerumen. Left Ear: Tympanic membrane, ear canal and external ear normal. There is no impacted cerumen. Nose: Nose normal. No congestion or rhinorrhea.       Mouth/Throat:      Mouth: Carbonate-Vitamin D (CALCIUM-VITAMIN D) 500-200 MG-UNIT per tablet Take 1 tablet by mouth daily      Probiotic Product (PROBIOTIC DAILY PO) Take by mouth      aspirin EC 81 MG EC tablet Take 1 tablet by mouth daily. 30 tablet 3    [DISCONTINUED] vitamin E 1000 units capsule Take 1,000 Units by mouth daily      [DISCONTINUED] Diphenhydramine-APAP, sleep, (TYLENOL PM EXTRA STRENGTH PO) Take 2 tablets by mouth nightly as needed       No facility-administered encounter medications on file as of 1/8/2020. No follow-ups on file.         Reviewed recent labs related to Brigitet's current problems      Discussed importance of regular Health Maintenance follow up  Health Maintenance   Topic    Shingles Vaccine (1 of 2)    Annual Wellness Visit (AWV)     Lipid screen     TSH testing     Potassium monitoring     Creatinine monitoring     DTaP/Tdap/Td vaccine (2 - Td)    Flu vaccine     Pneumococcal 65+ years Vaccine     DEXA (modify frequency per FRAX score)

## 2020-01-08 NOTE — PATIENT INSTRUCTIONS
Patient Education        High Blood Pressure: Care Instructions  Overview    It's normal for blood pressure to go up and down throughout the day. But if it stays up, you have high blood pressure. Another name for high blood pressure is hypertension. Despite what a lot of people think, high blood pressure usually doesn't cause headaches or make you feel dizzy or lightheaded. It usually has no symptoms. But it does increase your risk of stroke, heart attack, and other problems. You and your doctor will talk about your risks of these problems based on your blood pressure. Your doctor will give you a goal for your blood pressure. Your goal will be based on your health and your age. Lifestyle changes, such as eating healthy and being active, are always important to help lower blood pressure. You might also take medicine to reach your blood pressure goal.  Follow-up care is a key part of your treatment and safety. Be sure to make and go to all appointments, and call your doctor if you are having problems. It's also a good idea to know your test results and keep a list of the medicines you take. How can you care for yourself at home? Medical treatment  · If you stop taking your medicine, your blood pressure will go back up. You may take one or more types of medicine to lower your blood pressure. Be safe with medicines. Take your medicine exactly as prescribed. Call your doctor if you think you are having a problem with your medicine. · Talk to your doctor before you start taking aspirin every day. Aspirin can help certain people lower their risk of a heart attack or stroke. But taking aspirin isn't right for everyone, because it can cause serious bleeding. · See your doctor regularly. You may need to see the doctor more often at first or until your blood pressure comes down.   · If you are taking blood pressure medicine, talk to your doctor before you take decongestants or anti-inflammatory medicine, such as ibuprofen. Some of these medicines can raise blood pressure. · Learn how to check your blood pressure at home. Lifestyle changes  · Stay at a healthy weight. This is especially important if you put on weight around the waist. Losing even 10 pounds can help you lower your blood pressure. · If your doctor recommends it, get more exercise. Walking is a good choice. Bit by bit, increase the amount you walk every day. Try for at least 30 minutes on most days of the week. You also may want to swim, bike, or do other activities. · Avoid or limit alcohol. Talk to your doctor about whether you can drink any alcohol. · Try to limit how much sodium you eat to less than 2,300 milligrams (mg) a day. Your doctor may ask you to try to eat less than 1,500 mg a day. · Eat plenty of fruits (such as bananas and oranges), vegetables, legumes, whole grains, and low-fat dairy products. · Lower the amount of saturated fat in your diet. Saturated fat is found in animal products such as milk, cheese, and meat. Limiting these foods may help you lose weight and also lower your risk for heart disease. · Do not smoke. Smoking increases your risk for heart attack and stroke. If you need help quitting, talk to your doctor about stop-smoking programs and medicines. These can increase your chances of quitting for good. When should you call for help? Call  911 anytime you think you may need emergency care. This may mean having symptoms that suggest that your blood pressure is causing a serious heart or blood vessel problem. Your blood pressure may be over 180/120.   For example, call  911 if:    · You have symptoms of a heart attack. These may include:  ? Chest pain or pressure, or a strange feeling in the chest.  ? Sweating. ? Shortness of breath. ? Nausea or vomiting. ? Pain, pressure, or a strange feeling in the back, neck, jaw, or upper belly or in one or both shoulders or arms. ? Lightheadedness or sudden weakness.   ? A fast or

## 2020-07-13 RX ORDER — SPIRONOLACTONE 25 MG/1
TABLET ORAL
Qty: 30 TABLET | Refills: 0 | Status: SHIPPED
Start: 2020-07-13 | End: 2022-09-21 | Stop reason: ALTCHOICE

## 2020-07-13 RX ORDER — SIMVASTATIN 40 MG
TABLET ORAL
Qty: 30 TABLET | Refills: 0 | Status: SHIPPED
Start: 2020-07-13 | End: 2022-09-21 | Stop reason: DRUGHIGH

## 2020-08-10 RX ORDER — BUSPIRONE HYDROCHLORIDE 10 MG/1
TABLET ORAL
Qty: 270 TABLET | Refills: 0 | Status: SHIPPED
Start: 2020-08-10 | End: 2022-09-21 | Stop reason: DRUGHIGH

## 2020-08-10 RX ORDER — LEVOTHYROXINE SODIUM 0.07 MG/1
TABLET ORAL
Qty: 90 TABLET | Refills: 0 | Status: SHIPPED
Start: 2020-08-10 | End: 2022-09-21 | Stop reason: DRUGHIGH

## 2022-09-21 ENCOUNTER — APPOINTMENT (OUTPATIENT)
Dept: GENERAL RADIOLOGY | Age: 82
DRG: 811 | End: 2022-09-21
Payer: COMMERCIAL

## 2022-09-21 ENCOUNTER — HOSPITAL ENCOUNTER (INPATIENT)
Age: 82
LOS: 6 days | Discharge: HOME | DRG: 811 | End: 2022-09-27
Attending: EMERGENCY MEDICINE | Admitting: INTERNAL MEDICINE
Payer: COMMERCIAL

## 2022-09-21 DIAGNOSIS — D62 ACUTE BLOOD LOSS ANEMIA: Primary | ICD-10-CM

## 2022-09-21 LAB
ABO/RH: NORMAL
ANION GAP SERPL CALCULATED.3IONS-SCNC: 13 MMOL/L (ref 7–16)
ANISOCYTOSIS: ABNORMAL
ANTIBODY SCREEN: NORMAL
BASOPHILS ABSOLUTE: 0.1 E9/L (ref 0–0.2)
BASOPHILS RELATIVE PERCENT: 1 % (ref 0–2)
BLOOD BANK DISPENSE STATUS: NORMAL
BLOOD BANK DISPENSE STATUS: NORMAL
BLOOD BANK PRODUCT CODE: NORMAL
BLOOD BANK PRODUCT CODE: NORMAL
BPU ID: NORMAL
BPU ID: NORMAL
BUN BLDV-MCNC: 25 MG/DL (ref 6–23)
CALCIUM SERPL-MCNC: 9.1 MG/DL (ref 8.6–10.2)
CEA: 3.6 NG/ML (ref 0–5.2)
CHLORIDE BLD-SCNC: 105 MMOL/L (ref 98–107)
CO2: 21 MMOL/L (ref 22–29)
CREAT SERPL-MCNC: 0.9 MG/DL (ref 0.5–1)
DESCRIPTION BLOOD BANK: NORMAL
DESCRIPTION BLOOD BANK: NORMAL
EKG ATRIAL RATE: 99 BPM
EKG P AXIS: 64 DEGREES
EKG P-R INTERVAL: 156 MS
EKG Q-T INTERVAL: 368 MS
EKG QRS DURATION: 76 MS
EKG QTC CALCULATION (BAZETT): 472 MS
EKG R AXIS: 16 DEGREES
EKG T AXIS: 6 DEGREES
EKG VENTRICULAR RATE: 99 BPM
EOSINOPHILS ABSOLUTE: 0 E9/L (ref 0.05–0.5)
EOSINOPHILS RELATIVE PERCENT: 0 % (ref 0–6)
FERRITIN: 11 NG/ML
FOLATE: 12.2 NG/ML (ref 4.8–24.2)
GFR AFRICAN AMERICAN: >60
GFR NON-AFRICAN AMERICAN: >60 ML/MIN/1.73
GLUCOSE BLD-MCNC: 94 MG/DL (ref 74–99)
HCT VFR BLD CALC: 19.4 % (ref 34–48)
HCT VFR BLD CALC: 21.8 % (ref 34–48)
HEMOGLOBIN: 5 G/DL (ref 11.5–15.5)
HEMOGLOBIN: 6 G/DL (ref 11.5–15.5)
HYPOCHROMIA: ABNORMAL
INR BLD: 1.3
IRON SATURATION: 3 % (ref 15–50)
IRON: 14 MCG/DL (ref 37–145)
LYMPHOCYTES ABSOLUTE: 0.71 E9/L (ref 1.5–4)
LYMPHOCYTES RELATIVE PERCENT: 7 % (ref 20–42)
MCH RBC QN AUTO: 16.3 PG (ref 26–35)
MCHC RBC AUTO-ENTMCNC: 25.8 % (ref 32–34.5)
MCV RBC AUTO: 63.2 FL (ref 80–99.9)
MONOCYTES ABSOLUTE: 0.61 E9/L (ref 0.1–0.95)
MONOCYTES RELATIVE PERCENT: 6 % (ref 2–12)
NEUTROPHILS ABSOLUTE: 8.77 E9/L (ref 1.8–7.3)
NEUTROPHILS RELATIVE PERCENT: 86 % (ref 43–80)
NUCLEATED RED BLOOD CELLS: 1 /100 WBC
OVALOCYTES: ABNORMAL
PDW BLD-RTO: 19.3 FL (ref 11.5–15)
PLATELET # BLD: 339 E9/L (ref 130–450)
PMV BLD AUTO: 10 FL (ref 7–12)
POIKILOCYTES: ABNORMAL
POTASSIUM REFLEX MAGNESIUM: 4.1 MMOL/L (ref 3.5–5)
PRO-BNP: 8795 PG/ML (ref 0–450)
PROTHROMBIN TIME: 14.9 SEC (ref 9.3–12.4)
RBC # BLD: 3.07 E12/L (ref 3.5–5.5)
SARS-COV-2, NAAT: NOT DETECTED
SCHISTOCYTES: ABNORMAL
SODIUM BLD-SCNC: 139 MMOL/L (ref 132–146)
TARGET CELLS: ABNORMAL
TOTAL IRON BINDING CAPACITY: 410 MCG/DL (ref 250–450)
TROPONIN, HIGH SENSITIVITY: 135 NG/L (ref 0–9)
TROPONIN, HIGH SENSITIVITY: 162 NG/L (ref 0–9)
VITAMIN B-12: 481 PG/ML (ref 211–946)
WBC # BLD: 10.2 E9/L (ref 4.5–11.5)

## 2022-09-21 PROCEDURE — 83550 IRON BINDING TEST: CPT

## 2022-09-21 PROCEDURE — 82746 ASSAY OF FOLIC ACID SERUM: CPT

## 2022-09-21 PROCEDURE — 83880 ASSAY OF NATRIURETIC PEPTIDE: CPT

## 2022-09-21 PROCEDURE — 6370000000 HC RX 637 (ALT 250 FOR IP): Performed by: INTERNAL MEDICINE

## 2022-09-21 PROCEDURE — 86923 COMPATIBILITY TEST ELECTRIC: CPT

## 2022-09-21 PROCEDURE — 82607 VITAMIN B-12: CPT

## 2022-09-21 PROCEDURE — 87635 SARS-COV-2 COVID-19 AMP PRB: CPT

## 2022-09-21 PROCEDURE — 84484 ASSAY OF TROPONIN QUANT: CPT

## 2022-09-21 PROCEDURE — 1200000000 HC SEMI PRIVATE

## 2022-09-21 PROCEDURE — 86901 BLOOD TYPING SEROLOGIC RH(D): CPT

## 2022-09-21 PROCEDURE — 36430 TRANSFUSION BLD/BLD COMPNT: CPT

## 2022-09-21 PROCEDURE — 6360000002 HC RX W HCPCS: Performed by: INTERNAL MEDICINE

## 2022-09-21 PROCEDURE — 96374 THER/PROPH/DIAG INJ IV PUSH: CPT

## 2022-09-21 PROCEDURE — 85610 PROTHROMBIN TIME: CPT

## 2022-09-21 PROCEDURE — 2580000003 HC RX 258: Performed by: INTERNAL MEDICINE

## 2022-09-21 PROCEDURE — 82378 CARCINOEMBRYONIC ANTIGEN: CPT

## 2022-09-21 PROCEDURE — 86900 BLOOD TYPING SEROLOGIC ABO: CPT

## 2022-09-21 PROCEDURE — 83540 ASSAY OF IRON: CPT

## 2022-09-21 PROCEDURE — C9113 INJ PANTOPRAZOLE SODIUM, VIA: HCPCS | Performed by: INTERNAL MEDICINE

## 2022-09-21 PROCEDURE — 85018 HEMOGLOBIN: CPT

## 2022-09-21 PROCEDURE — 71045 X-RAY EXAM CHEST 1 VIEW: CPT

## 2022-09-21 PROCEDURE — 85025 COMPLETE CBC W/AUTO DIFF WBC: CPT

## 2022-09-21 PROCEDURE — 99285 EMERGENCY DEPT VISIT HI MDM: CPT

## 2022-09-21 PROCEDURE — 86850 RBC ANTIBODY SCREEN: CPT

## 2022-09-21 PROCEDURE — P9016 RBC LEUKOCYTES REDUCED: HCPCS

## 2022-09-21 PROCEDURE — 93005 ELECTROCARDIOGRAM TRACING: CPT | Performed by: STUDENT IN AN ORGANIZED HEALTH CARE EDUCATION/TRAINING PROGRAM

## 2022-09-21 PROCEDURE — 80048 BASIC METABOLIC PNL TOTAL CA: CPT

## 2022-09-21 PROCEDURE — 85014 HEMATOCRIT: CPT

## 2022-09-21 PROCEDURE — 2580000003 HC RX 258: Performed by: STUDENT IN AN ORGANIZED HEALTH CARE EDUCATION/TRAINING PROGRAM

## 2022-09-21 PROCEDURE — 82728 ASSAY OF FERRITIN: CPT

## 2022-09-21 PROCEDURE — 6360000002 HC RX W HCPCS: Performed by: STUDENT IN AN ORGANIZED HEALTH CARE EDUCATION/TRAINING PROGRAM

## 2022-09-21 PROCEDURE — 36415 COLL VENOUS BLD VENIPUNCTURE: CPT

## 2022-09-21 RX ORDER — 0.9 % SODIUM CHLORIDE 0.9 %
1000 INTRAVENOUS SOLUTION INTRAVENOUS ONCE
Status: DISCONTINUED | OUTPATIENT
Start: 2022-09-21 | End: 2022-09-22

## 2022-09-21 RX ORDER — MEMANTINE HYDROCHLORIDE 10 MG/1
10 TABLET ORAL 2 TIMES DAILY
COMMUNITY

## 2022-09-21 RX ORDER — DONEPEZIL HYDROCHLORIDE 10 MG/1
10 TABLET, FILM COATED ORAL NIGHTLY
COMMUNITY

## 2022-09-21 RX ORDER — ACETAMINOPHEN 650 MG/1
650 SUPPOSITORY RECTAL EVERY 6 HOURS PRN
Status: DISCONTINUED | OUTPATIENT
Start: 2022-09-21 | End: 2022-09-27 | Stop reason: HOSPADM

## 2022-09-21 RX ORDER — SODIUM CHLORIDE 0.9 % (FLUSH) 0.9 %
5-40 SYRINGE (ML) INJECTION PRN
Status: DISCONTINUED | OUTPATIENT
Start: 2022-09-21 | End: 2022-09-27 | Stop reason: HOSPADM

## 2022-09-21 RX ORDER — FENTANYL CITRATE 0.05 MG/ML
50 INJECTION, SOLUTION INTRAMUSCULAR; INTRAVENOUS ONCE
Status: COMPLETED | OUTPATIENT
Start: 2022-09-21 | End: 2022-09-21

## 2022-09-21 RX ORDER — CALCIUM CARBONATE/VITAMIN D3 600 MG-20
1 TABLET ORAL EVERY MORNING
COMMUNITY

## 2022-09-21 RX ORDER — BUSPIRONE HYDROCHLORIDE 10 MG/1
10 TABLET ORAL 2 TIMES DAILY
COMMUNITY

## 2022-09-21 RX ORDER — SIMVASTATIN 40 MG
40 TABLET ORAL NIGHTLY
COMMUNITY

## 2022-09-21 RX ORDER — DONEPEZIL HYDROCHLORIDE 5 MG/1
10 TABLET, FILM COATED ORAL NIGHTLY
Status: DISCONTINUED | OUTPATIENT
Start: 2022-09-21 | End: 2022-09-27 | Stop reason: HOSPADM

## 2022-09-21 RX ORDER — FUROSEMIDE 10 MG/ML
40 INJECTION INTRAMUSCULAR; INTRAVENOUS ONCE
Status: COMPLETED | OUTPATIENT
Start: 2022-09-21 | End: 2022-09-21

## 2022-09-21 RX ORDER — PANTOPRAZOLE SODIUM 40 MG/10ML
40 INJECTION, POWDER, LYOPHILIZED, FOR SOLUTION INTRAVENOUS 2 TIMES DAILY
Status: DISCONTINUED | OUTPATIENT
Start: 2022-09-21 | End: 2022-09-24

## 2022-09-21 RX ORDER — SODIUM CHLORIDE 9 MG/ML
INJECTION, SOLUTION INTRAVENOUS PRN
Status: DISCONTINUED | OUTPATIENT
Start: 2022-09-21 | End: 2022-09-27 | Stop reason: HOSPADM

## 2022-09-21 RX ORDER — QUETIAPINE FUMARATE 25 MG/1
12.5 TABLET, FILM COATED ORAL NIGHTLY
Status: DISCONTINUED | OUTPATIENT
Start: 2022-09-21 | End: 2022-09-27 | Stop reason: HOSPADM

## 2022-09-21 RX ORDER — MEMANTINE HYDROCHLORIDE 10 MG/1
10 TABLET ORAL 2 TIMES DAILY
Status: DISCONTINUED | OUTPATIENT
Start: 2022-09-21 | End: 2022-09-27 | Stop reason: HOSPADM

## 2022-09-21 RX ORDER — GUAIFENESIN 400 MG/1
400 TABLET ORAL EVERY 12 HOURS PRN
COMMUNITY

## 2022-09-21 RX ORDER — LEVOTHYROXINE SODIUM 0.07 MG/1
75 TABLET ORAL EVERY MORNING
Status: DISCONTINUED | OUTPATIENT
Start: 2022-09-22 | End: 2022-09-27 | Stop reason: HOSPADM

## 2022-09-21 RX ORDER — GABAPENTIN 100 MG/1
100 CAPSULE ORAL 3 TIMES DAILY
Status: DISCONTINUED | OUTPATIENT
Start: 2022-09-21 | End: 2022-09-27 | Stop reason: HOSPADM

## 2022-09-21 RX ORDER — FUROSEMIDE 10 MG/ML
20 INJECTION INTRAMUSCULAR; INTRAVENOUS ONCE
Status: DISCONTINUED | OUTPATIENT
Start: 2022-09-21 | End: 2022-09-21

## 2022-09-21 RX ORDER — LOPERAMIDE HYDROCHLORIDE 2 MG/1
2 CAPSULE ORAL EVERY MORNING
COMMUNITY
End: 2022-10-26 | Stop reason: SDUPTHER

## 2022-09-21 RX ORDER — LEVOTHYROXINE SODIUM 0.07 MG/1
75 TABLET ORAL EVERY MORNING
COMMUNITY

## 2022-09-21 RX ORDER — IPRATROPIUM BROMIDE AND ALBUTEROL SULFATE 2.5; .5 MG/3ML; MG/3ML
1 SOLUTION RESPIRATORY (INHALATION) EVERY 4 HOURS PRN
Status: DISCONTINUED | OUTPATIENT
Start: 2022-09-21 | End: 2022-09-27 | Stop reason: HOSPADM

## 2022-09-21 RX ORDER — SPIRONOLACTONE 25 MG/1
25 TABLET ORAL EVERY MORNING
Status: ON HOLD | COMMUNITY
End: 2022-09-27 | Stop reason: HOSPADM

## 2022-09-21 RX ORDER — SODIUM CHLORIDE 9 MG/ML
INJECTION, SOLUTION INTRAVENOUS PRN
Status: COMPLETED | OUTPATIENT
Start: 2022-09-21 | End: 2022-09-21

## 2022-09-21 RX ORDER — ACETAMINOPHEN 325 MG/1
650 TABLET ORAL EVERY 6 HOURS PRN
Status: DISCONTINUED | OUTPATIENT
Start: 2022-09-21 | End: 2022-09-27 | Stop reason: HOSPADM

## 2022-09-21 RX ORDER — DULOXETIN HYDROCHLORIDE 30 MG/1
30 CAPSULE, DELAYED RELEASE ORAL EVERY MORNING
Status: DISCONTINUED | OUTPATIENT
Start: 2022-09-22 | End: 2022-09-27 | Stop reason: HOSPADM

## 2022-09-21 RX ORDER — HALOPERIDOL 5 MG/ML
5 INJECTION INTRAMUSCULAR NIGHTLY PRN
Status: DISCONTINUED | OUTPATIENT
Start: 2022-09-21 | End: 2022-09-27 | Stop reason: HOSPADM

## 2022-09-21 RX ORDER — LOPERAMIDE HYDROCHLORIDE 2 MG/1
2 CAPSULE ORAL
COMMUNITY

## 2022-09-21 RX ORDER — DULOXETIN HYDROCHLORIDE 30 MG/1
30 CAPSULE, DELAYED RELEASE ORAL EVERY MORNING
COMMUNITY

## 2022-09-21 RX ORDER — GABAPENTIN 100 MG/1
100 CAPSULE ORAL 3 TIMES DAILY
COMMUNITY

## 2022-09-21 RX ORDER — DEXTROSE MONOHYDRATE 100 MG/ML
INJECTION, SOLUTION INTRAVENOUS CONTINUOUS PRN
Status: DISCONTINUED | OUTPATIENT
Start: 2022-09-21 | End: 2022-09-27 | Stop reason: HOSPADM

## 2022-09-21 RX ORDER — SODIUM CHLORIDE 0.9 % (FLUSH) 0.9 %
5-40 SYRINGE (ML) INJECTION EVERY 12 HOURS SCHEDULED
Status: DISCONTINUED | OUTPATIENT
Start: 2022-09-21 | End: 2022-09-27 | Stop reason: HOSPADM

## 2022-09-21 RX ORDER — PHENOL 1.4 %
1 AEROSOL, SPRAY (ML) MUCOUS MEMBRANE NIGHTLY
COMMUNITY

## 2022-09-21 RX ORDER — POLYETHYLENE GLYCOL 3350 17 G/17G
17 POWDER, FOR SOLUTION ORAL DAILY PRN
Status: DISCONTINUED | OUTPATIENT
Start: 2022-09-21 | End: 2022-09-27 | Stop reason: HOSPADM

## 2022-09-21 RX ORDER — BUSPIRONE HYDROCHLORIDE 10 MG/1
10 TABLET ORAL 2 TIMES DAILY
Status: DISCONTINUED | OUTPATIENT
Start: 2022-09-21 | End: 2022-09-27 | Stop reason: HOSPADM

## 2022-09-21 RX ORDER — MECOBALAMIN 5000 MCG
10 TABLET,DISINTEGRATING ORAL NIGHTLY
Status: DISCONTINUED | OUTPATIENT
Start: 2022-09-21 | End: 2022-09-27 | Stop reason: HOSPADM

## 2022-09-21 RX ADMIN — SODIUM CHLORIDE 1000 ML: 9 INJECTION, SOLUTION INTRAVENOUS at 18:18

## 2022-09-21 RX ADMIN — ACETAMINOPHEN 650 MG: 325 TABLET ORAL at 18:25

## 2022-09-21 RX ADMIN — Medication 10 ML: at 19:50

## 2022-09-21 RX ADMIN — FENTANYL CITRATE 50 MCG: 50 INJECTION INTRAMUSCULAR; INTRAVENOUS at 11:33

## 2022-09-21 RX ADMIN — GABAPENTIN 100 MG: 100 CAPSULE ORAL at 19:49

## 2022-09-21 RX ADMIN — FUROSEMIDE 40 MG: 10 INJECTION, SOLUTION INTRAMUSCULAR; INTRAVENOUS at 22:20

## 2022-09-21 RX ADMIN — DONEPEZIL HYDROCHLORIDE 10 MG: 5 TABLET, FILM COATED ORAL at 19:49

## 2022-09-21 RX ADMIN — PANTOPRAZOLE SODIUM 40 MG: 40 INJECTION, POWDER, FOR SOLUTION INTRAVENOUS at 18:14

## 2022-09-21 RX ADMIN — QUETIAPINE FUMARATE 12.5 MG: 25 TABLET ORAL at 22:20

## 2022-09-21 RX ADMIN — SODIUM CHLORIDE: 900 INJECTION, SOLUTION INTRAVENOUS at 13:28

## 2022-09-21 RX ADMIN — BUSPIRONE HYDROCHLORIDE 10 MG: 10 TABLET ORAL at 19:49

## 2022-09-21 RX ADMIN — MEMANTINE HYDROCHLORIDE 10 MG: 10 TABLET ORAL at 19:50

## 2022-09-21 RX ADMIN — Medication 10 MG: at 19:49

## 2022-09-21 ASSESSMENT — PAIN DESCRIPTION - PAIN TYPE: TYPE: ACUTE PAIN

## 2022-09-21 ASSESSMENT — PAIN SCALES - GENERAL
PAINLEVEL_OUTOF10: 4
PAINLEVEL_OUTOF10: 1

## 2022-09-21 ASSESSMENT — PAIN - FUNCTIONAL ASSESSMENT
PAIN_FUNCTIONAL_ASSESSMENT: ACTIVITIES ARE NOT PREVENTED
PAIN_FUNCTIONAL_ASSESSMENT: NONE - DENIES PAIN
PAIN_FUNCTIONAL_ASSESSMENT: PREVENTS OR INTERFERES SOME ACTIVE ACTIVITIES AND ADLS

## 2022-09-21 ASSESSMENT — PAIN SCALES - WONG BAKER: WONGBAKER_NUMERICALRESPONSE: 2

## 2022-09-21 ASSESSMENT — ENCOUNTER SYMPTOMS
NAUSEA: 0
COLOR CHANGE: 0
SHORTNESS OF BREATH: 1
SINUS PAIN: 0
VOMITING: 0
COUGH: 0
BACK PAIN: 0
SORE THROAT: 0
SINUS PRESSURE: 0
DIARRHEA: 0
ABDOMINAL PAIN: 0
CHEST TIGHTNESS: 0
CONSTIPATION: 0
ABDOMINAL DISTENTION: 0

## 2022-09-21 ASSESSMENT — PAIN DESCRIPTION - ORIENTATION: ORIENTATION: MID

## 2022-09-21 ASSESSMENT — PAIN DESCRIPTION - DESCRIPTORS
DESCRIPTORS: ACHING
DESCRIPTORS: HEAVINESS

## 2022-09-21 ASSESSMENT — PAIN DESCRIPTION - LOCATION
LOCATION: ABDOMEN;CHEST
LOCATION: BACK

## 2022-09-21 ASSESSMENT — LIFESTYLE VARIABLES: HOW OFTEN DO YOU HAVE A DRINK CONTAINING ALCOHOL: NEVER

## 2022-09-21 NOTE — ED PROVIDER NOTES
Patient  returning call, connected to triage .   The history is provided by the patient and medical records. 79-year-old female history of Alzheimer's as well as fibromyalgia presents emergency department complaint chest pain and tightness. Patient states this morning was feeling anxious and starting some chest tightness and shortness of breath. EMS was called to her assisted living facility. They gave her 325 of aspirin patient states her symptoms are resolved at this time she is feeling much better at this time. She otherwise denies any other acute complaints denies any fever chills cough just runny nose no other acute complaints. The patient presents with chest pain that has been going on for 2-3 days. These symptoms are moderate in severity. Symptoms are made better by nothing. Symptoms are made worse by nothing. Associated symptoms include none. Review of Systems   Constitutional:  Negative for chills, fatigue and fever. HENT:  Negative for congestion, sinus pressure, sinus pain and sore throat. Respiratory:  Positive for shortness of breath. Negative for cough and chest tightness. Cardiovascular:  Positive for chest pain. Negative for leg swelling. Gastrointestinal:  Negative for abdominal distention, abdominal pain, constipation, diarrhea, nausea and vomiting. Endocrine: Negative for polyuria. Genitourinary:  Negative for difficulty urinating, dysuria, frequency, hematuria, urgency, vaginal bleeding and vaginal discharge. Musculoskeletal:  Negative for arthralgias and back pain. Skin:  Negative for color change and pallor. Neurological:  Negative for dizziness and weakness. Physical Exam  Vitals and nursing note reviewed. Constitutional:       Appearance: Normal appearance. She is normal weight. HENT:      Head: Normocephalic and atraumatic. Eyes:      Conjunctiva/sclera: Conjunctivae normal.   Cardiovascular:      Rate and Rhythm: Normal rate and regular rhythm. Pulses: Normal pulses.       Heart sounds: Normal heart sounds. No murmur heard. No gallop. Pulmonary:      Effort: Pulmonary effort is normal. No respiratory distress. Breath sounds: Normal breath sounds. No wheezing or rales. Abdominal:      General: Abdomen is flat. Bowel sounds are normal. There is no distension. Palpations: Abdomen is soft. Tenderness: There is no abdominal tenderness. There is no guarding. Skin:     General: Skin is warm and dry. Capillary Refill: Capillary refill takes less than 2 seconds. Neurological:      General: No focal deficit present. Mental Status: She is alert. Comments: Alert and oriented to self which is her normal baseline   Psychiatric:         Mood and Affect: Mood normal.         Thought Content: Thought content normal.        Procedures     MDM     Amount and/or Complexity of Data Reviewed  Clinical lab tests: reviewed  Tests in the radiology section of CPT®: reviewed  Tests in the medicine section of CPT®: reviewed  Decide to obtain previous medical records or to obtain history from someone other than the patient: yes    51-year-old female presents with complaint shortness of breath fatigue slight chest pain. Symptoms are improving while here. Patient's laboratory work-up is showing stable EKG no acute ST elevation or depression however does have a hemoglobin of 5 was it is significantly less than 2 years ago where it was 12.6. She will be given blood transfusion. Rectal exam performed did show brown stool Hemoccult negative. Remaining laboratory work-up is reassuring. She will need to be admitted to the hospital at this time due to her significant acute blood loss anemia. Did speak with admitting physician who is agreeable to this plan. Patient otherwise hemodynamically stable. ED Course as of 10/05/22 0821   Wed Sep 21, 2022   0935 EKG: This EKG is signed by emergency department physician.     Rate: 99  Rhythm: Sinus  Interpretation: No acute ST elevation ATTESTATION:     I have personally performed and/or participated in the history, exam, medical decision making, and procedures and agree with all pertinent clinical information unless otherwise noted. I have also reviewed and agree with the past medical, family and social history unless otherwise noted. I have discussed this patient in detail with the resident and provided the instruction and education regarding the evidence-based evaluation and treatment of SOB. Any EKG that may have been performed has been personally reviewed by me and I agree with the documentation as noted by the resident. History: patient states she awoke with SOB. She denies CP. She admits to fibromyalgia and increased anxiety. She denies fever, chills or cough. No increased edema of the lower extremities. My findings: Rafa Atkins is a [de-identified] y.o. female whom is in no distress. Physical exam reveals well appearing. Heart RRR, lungs with slight upper left chest expiratory wheeze. No peripheral edema. No use of accessory muscles. She is anxious. My plan: Symptomatic and supportive care. Will evaluate with imaging, ekg and labs. Electronically signed by Maurilio Chun DO on 9/21/22 at 9:36 AM EDT       [JS]   1050 Rectal exam performed, brown stool Hemoccult negative [CB]   1253 Spoke with Dr. Anastasia Cardenas he is agreeable with admission [CB]      ED Course User Index  [CB] Durell Shone, MD  [JS] Maurilio Chun DO       --------------------------------------------- PAST HISTORY ---------------------------------------------  Past Medical History:  has a past medical history of Hyperlipidemia and Hypertension. Past Surgical History:  has a past surgical history that includes Colonoscopy; Endoscopy, colon, diagnostic; Hysterectomy; joint replacement (Bilateral); and hernia repair (Left, 12/1/2015). Social History:  reports that she quit smoking about 30 years ago. Her smoking use included cigarettes.  She Non-African American >60 >=60 mL/min/1.73    GFR African American >60     Calcium 9.1 8.6 - 10.2 mg/dL   Troponin   Result Value Ref Range    Troponin, High Sensitivity 135 (H) 0 - 9 ng/L   Troponin   Result Value Ref Range    Troponin, High Sensitivity 162 (H) 0 - 9 ng/L   EKG 12 Lead   Result Value Ref Range    Ventricular Rate 99 BPM    Atrial Rate 99 BPM    P-R Interval 156 ms    QRS Duration 76 ms    Q-T Interval 368 ms    QTc Calculation (Bazett) 472 ms    P Axis 64 degrees    R Axis 16 degrees    T Axis 6 degrees   TYPE AND SCREEN   Result Value Ref Range    ABO/Rh O POS     Antibody Screen NEG    PREPARE RBC (CROSSMATCH), 1 Units   Result Value Ref Range    Product Code Blood Bank [id number]     Description Blood Bank Red Blood Cells, Apheresis, Leuko-reduced     Unit Number [id number]     Dispense Status Blood Bank issued        RADIOLOGY:  XR CHEST PORTABLE   Final Result   No acute process           ]      ------------------------- NURSING NOTES AND VITALS REVIEWED ---------------------------  Date / Time Roomed:  9/21/2022  9:18 AM  ED Bed Assignment:  09/09    The nursing notes within the ED encounter and vital signs as below have been reviewed.      Patient Vitals for the past 24 hrs:   BP Temp Temp src Pulse Resp SpO2 Height Weight   09/21/22 1351 107/64 99.7 °F (37.6 °C) Oral 99 20 97 % -- --   09/21/22 1241 (!) 99/57 -- -- 96 22 (!) 84 % -- --   09/21/22 1047 (!) 93/54 -- -- 86 24 98 % -- --   09/21/22 0944 108/63 98.2 °F (36.8 °C) Oral 100 24 100 % 5' 8.5\" (1.74 m) 180 lb 8 oz (81.9 kg)       Oxygen Saturation Interpretation: Normal    ------------------------------------------ PROGRESS NOTES ------------------------------------------  Re-evaluation(s):  Time: 1300  Patients symptoms show no change  Repeat physical examination is not changed    Counseling:  I have spoken with the patient and discussed todays results, in addition to providing specific details for the plan of care and counseling regarding the diagnosis and prognosis. Their questions are answered at this time and they are agreeable with the plan of admission.    --------------------------------- ADDITIONAL PROVIDER NOTES ---------------------------------  Consultations:  . Spoke with Dr. Clifford Holman. Discussed case. They will admit the patient. This patient's ED course included: a personal history and physicial examination, re-evaluation prior to disposition, multiple bedside re-evaluations, IV medications, cardiac monitoring, and continuous pulse oximetry    This patient has remained hemodynamically stable during their ED course. Diagnosis:  1. Acute blood loss anemia        Disposition:  Patient's disposition: Admit to telemetry  Patient's condition is stable. Blue Aguilar MD  Resident  09/21/22 1356    Critical care 30 mins excluding billable procedures.      Rajesh Ford DO  10/05/22 0223

## 2022-09-21 NOTE — H&P
Department of Internal Medicine  History and Physical    PCP: Ulysses Schultz DO  Admitting Physician: Dr. Steve Wyatt  Consultants:   Date of Service: 9/21/2022    CHIEF COMPLAINT:  chest pain    HISTORY OF PRESENT ILLNESS:    Patient is 82-year-old female presented from assisted nursing/assisted living facility due to chest pain. Patient states that since Monday she has been having chest pain. She has associated increased shortness of breath and malaise. Today's she continued to have chest discomfort as well as weakness and malaise. As such presented to the ED for further evaluation management. Patient states she has a had a upper endoscopy and colonoscopy in the past.  Denies any source of bleeding identified in the past.  Patient does take aspirin. However she denies any NSAID use. PAST MEDICAL Hx:  Past Medical History:   Diagnosis Date    Alzheimer disease ([facility] [address]     Hyperlipidemia     Hypertension        PAST SURGICAL Hx:   Past Surgical History:   Procedure Laterality Date    COLONOSCOPY      ENDOSCOPY, COLON, DIAGNOSTIC      HERNIA REPAIR Left 12/1/2015    femoral    HYSTERECTOMY (CERVIX STATUS UNKNOWN)      JOINT REPLACEMENT Bilateral        FAMILY Hx:  History reviewed. No pertinent family history. HOME MEDICATIONS:  Prior to Admission medications    Medication Sig Start Date End Date Taking? Authorizing Provider   busPIRone (BUSPAR) 10 MG tablet Take 10 mg by mouth in the morning and at bedtime   Yes Historical Provider, MD   donepezil (ARICEPT) 10 MG tablet Take 10 mg by mouth nightly   Yes Historical Provider, MD   Calcium Carb-Cholecalciferol (CALCIUM+D3) 600-800 MG-UNIT TABS Take 1 tablet by mouth every morning   Yes Historical Provider, MD   DULoxetine (CYMBALTA) 30 MG extended release capsule Take 30 mg by mouth every morning   Yes Historical Provider, MD   gabapentin (NEURONTIN) 100 MG capsule Take 100 mg by mouth 3 times daily.    Yes Historical Provider, MD   loperamide (IMODIUM) 2 MG capsule Take 2 mg by mouth every 4 hours as needed for Diarrhea Do not exceed 8 mg daily   Yes Historical Provider, MD   loperamide (IMODIUM) 2 MG capsule Take 2 mg by mouth three times a week    Yes Historical Provider, MD   Melatonin 10 MG TABS Take 1 tablet by mouth at bedtime   Yes Historical Provider, MD   guaiFENesin 400 MG tablet Take 400 mg by mouth every 12 hours as needed for Cough (Congestion)   Yes Historical Provider, MD   spironolactone (ALDACTONE) 25 MG tablet Take 25 mg by mouth every morning   Yes Historical Provider, MD   memantine (NAMENDA) 10 MG tablet Take 10 mg by mouth 2 times daily   Yes Historical Provider, MD   simvastatin (ZOCOR) 40 MG tablet Take 40 mg by mouth nightly   Yes Historical Provider, MD   QUEtiapine (SEROQUEL) 12.5 MG TABS Take 12.5 mg by mouth nightly   Yes Historical Provider, MD   levothyroxine (SYNTHROID) 75 MCG tablet Take 75 mcg by mouth every morning   Yes Historical Provider, MD   Multiple Vitamin (MULTI-DAY PO) Take 1 tablet by mouth every morning    Historical Provider, MD   acetaminophen (TYLENOL) 325 MG tablet Take 650 mg by mouth every 6 hours as needed for Pain or Fever    Historical Provider, MD   Probiotic Product (PROBIOTIC DAILY PO) Take 1 capsule by mouth every morning    Historical Provider, MD   aspirin EC 81 MG EC tablet Take 1 tablet by mouth daily. Rip Anand,        ALLERGIES:  Patient has no known allergies.     SOCIAL Hx:  Social History     Socioeconomic History    Marital status:      Spouse name: Not on file    Number of children: Not on file    Years of education: Not on file    Highest education level: Not on file   Occupational History    Not on file   Tobacco Use    Smoking status: Former     Packs/day: 0.50     Years: 31.00     Pack years: 15.50     Types: Cigarettes     Start date: 1961     Quit date: 8/3/1992     Years since quittin.1    Smokeless tobacco: Never   Vaping Use    Vaping Use: Never used   Substance and Sexual Activity    Alcohol use: Not Currently     Alcohol/week: 0.0 standard drinks    Drug use: No    Sexual activity: Not on file   Other Topics Concern    Not on file   Social History Narrative    Not on file     Social Determinants of Health     Financial Resource Strain: Not on file   Food Insecurity: Not on file   Transportation Needs: Not on file   Physical Activity: Not on file   Stress: Not on file   Social Connections: Not on file   Intimate Partner Violence: Not on file   Housing Stability: Not on file       ROS: Positive in bold  General:   Denies chills, fatigue, fever, malaise, night sweats or weight loss    Psychological:   Denies anxiety, disorientation or hallucinations    ENT:    Denies epistaxis, headaches, vertigo or visual changes    Cardiovascular:   Denies any chest pain, irregular heartbeats, or palpitations. No paroxysmal nocturnal dyspnea. Respiratory:   Denies shortness of breath, coughing, sputum production, hemoptysis, or wheezing. No orthopnea. Gastrointestinal:   Denies nausea, vomiting, diarrhea, or constipation. Denies any abdominal pain. Denies change in bowel habits or stools. Genito-Urinary:    Denies any urgency, frequency, hematuria. Voiding without difficulty. Musculoskeletal:   Denies joint pain, joint stiffness, joint swelling or muscle pain    Neurology:    Denies any headache or focal neurological deficits. No weakness or paresthesia. Derm:    Denies any rashes, ulcers, or excoriations. Denies bruising. Extremities:   Denies any lower extremity swelling or edema.       PHYSICAL EXAM: Abnormal findings noted  VITALS:  Vitals:    09/21/22 1530   BP: 111/68   Pulse: 97   Resp: 20   Temp: 98 °F (36.7 °C)   SpO2: 94%         CONSTITUTIONAL:    Awake, alert, cooperative, no apparent distress, and appears stated age    EYES:     EOMI, sclera clear, conjunctiva normal    ENT:    Normocephalic, atraumatic, External ears without lesions. NECK:    Supple, symmetrical, trachea midline,  no JVD    HEMATOLOGIC/LYMPHATICS:    No cervical lymphadenopathy and no supraclavicular lymphadenopathy    LUNGS:    Symmetric. No increased work of breathing, good air exchange, clear to auscultation bilaterally, no wheezes, rhonchi, or rales,     CARDIOVASCULAR:    Normal apical impulse, regular rate and rhythm, normal S1 and S2, no S3 or S4, and no murmur noted    ABDOMEN:     soft, non-distended, non-tender    MUSCULOSKELETAL:    There is no redness, warmth, or swelling of the joints. NEUROLOGIC:    Awake, alert, oriented to name, place and time. SKIN:    No bruising or bleeding. No redness, warmth, or swelling    EXTREMITIES:    Peripheral pulses present. No edema, cyanosis, or swelling.     LINES/CATHETERS     LABORATORY DATA:  CBC with Differential:    Lab Results   Component Value Date/Time    WBC 10.2 09/21/2022 09:55 AM    RBC 3.07 09/21/2022 09:55 AM    HGB 6.0 09/21/2022 07:33 PM    HCT 21.8 09/21/2022 07:33 PM     09/21/2022 09:55 AM    MCV 63.2 09/21/2022 09:55 AM    MCH 16.3 09/21/2022 09:55 AM    MCHC 25.8 09/21/2022 09:55 AM    RDW 19.3 09/21/2022 09:55 AM    NRBC 1.0 09/21/2022 09:55 AM    SEGSPCT 55 11/01/2013 12:00 PM    LYMPHOPCT 7.0 09/21/2022 09:55 AM    MONOPCT 6.0 09/21/2022 09:55 AM    BASOPCT 1.0 09/21/2022 09:55 AM    MONOSABS 0.61 09/21/2022 09:55 AM    LYMPHSABS 0.71 09/21/2022 09:55 AM    EOSABS 0.00 09/21/2022 09:55 AM    BASOSABS 0.10 09/21/2022 09:55 AM     CMP:    Lab Results   Component Value Date/Time     09/21/2022 09:55 AM    K 4.1 09/21/2022 09:55 AM     09/21/2022 09:55 AM    CO2 21 09/21/2022 09:55 AM    BUN 25 09/21/2022 09:55 AM    CREATININE 0.9 09/21/2022 09:55 AM    GFRAA >60 09/21/2022 09:55 AM    LABGLOM >60 09/21/2022 09:55 AM    GLUCOSE 94 09/21/2022 09:55 AM    PROT 7.3 07/02/2019 06:55 PM    LABALBU 4.5 07/02/2019 06:55 PM    CALCIUM 9.1 09/21/2022 09:55 AM    BILITOT 0.5 07/02/2019 06:55 PM    ALKPHOS 119 07/02/2019 06:55 PM    AST 20 07/02/2019 06:55 PM    ALT 12 07/02/2019 06:55 PM       ASSESSMENT/PLAN:  Acute anemia  Elevated troponin  Hypertension  Hyperlipidemia  Alzheimer's disease  History of tobacco abuse      Patient found to be anemic. No signs of bleed. Patient started on Protonix twice daily. Patient transfused 1 unit. 1 more unit PRBC will be transfused tonight. GI will be consulted. Continue to monitor H&H. Monitor pulse ox as well. Obtain echocardiogram.  Cardiology consulted in the setting of elevated troponin.       Pita Abad  4:30 PM  9/21/2022    Electronically signed by Kate Garcia DO on 9/21/22 at 4:30 PM EDT

## 2022-09-22 ENCOUNTER — ANESTHESIA EVENT (OUTPATIENT)
Dept: ENDOSCOPY | Age: 82
DRG: 811 | End: 2022-09-22
Payer: COMMERCIAL

## 2022-09-22 PROBLEM — J96.01 ACUTE HYPOXEMIC RESPIRATORY FAILURE (HCC): Status: ACTIVE | Noted: 2022-09-22

## 2022-09-22 PROBLEM — R77.8 ELEVATED TROPONIN: Status: ACTIVE | Noted: 2022-09-22

## 2022-09-22 PROBLEM — I50.9 ACUTE DECOMPENSATED HEART FAILURE (HCC): Status: ACTIVE | Noted: 2022-09-22

## 2022-09-22 PROBLEM — R79.89 ELEVATED TROPONIN: Status: ACTIVE | Noted: 2022-09-22

## 2022-09-22 LAB
ACANTHOCYTES: ABNORMAL
ALBUMIN SERPL-MCNC: 3.4 G/DL (ref 3.5–5.2)
ALP BLD-CCNC: 127 U/L (ref 35–104)
ALT SERPL-CCNC: 14 U/L (ref 0–32)
ANION GAP SERPL CALCULATED.3IONS-SCNC: 9 MMOL/L (ref 7–16)
ANISOCYTOSIS: ABNORMAL
AST SERPL-CCNC: 26 U/L (ref 0–31)
BACTERIA: ABNORMAL /HPF
BASOPHILS ABSOLUTE: 0.09 E9/L (ref 0–0.2)
BASOPHILS RELATIVE PERCENT: 0.9 % (ref 0–2)
BILIRUB SERPL-MCNC: 1.6 MG/DL (ref 0–1.2)
BILIRUBIN URINE: NEGATIVE
BLOOD, URINE: ABNORMAL
BUN BLDV-MCNC: 17 MG/DL (ref 6–23)
BURR CELLS: ABNORMAL
CALCIUM SERPL-MCNC: 8.8 MG/DL (ref 8.6–10.2)
CHLORIDE BLD-SCNC: 103 MMOL/L (ref 98–107)
CLARITY: CLEAR
CO2: 24 MMOL/L (ref 22–29)
COLOR: YELLOW
CREAT SERPL-MCNC: 0.7 MG/DL (ref 0.5–1)
EOSINOPHILS ABSOLUTE: 0.09 E9/L (ref 0.05–0.5)
EOSINOPHILS RELATIVE PERCENT: 0.9 % (ref 0–6)
EPITHELIAL CELLS, UA: ABNORMAL /HPF
GFR AFRICAN AMERICAN: >60
GFR NON-AFRICAN AMERICAN: >60 ML/MIN/1.73
GLUCOSE BLD-MCNC: 95 MG/DL (ref 74–99)
GLUCOSE URINE: NEGATIVE MG/DL
HCT VFR BLD CALC: 25.6 % (ref 34–48)
HCT VFR BLD CALC: 27.4 % (ref 34–48)
HEMOGLOBIN: 7.4 G/DL (ref 11.5–15.5)
HEMOGLOBIN: 7.9 G/DL (ref 11.5–15.5)
HYPOCHROMIA: ABNORMAL
INR BLD: 1.3
KETONES, URINE: NEGATIVE MG/DL
LEUKOCYTE ESTERASE, URINE: ABNORMAL
LV EF: 38 %
LVEF MODALITY: NORMAL
LYMPHOCYTES ABSOLUTE: 1.98 E9/L (ref 1.5–4)
LYMPHOCYTES RELATIVE PERCENT: 19.1 % (ref 20–42)
MAGNESIUM: 2.2 MG/DL (ref 1.6–2.6)
MCH RBC QN AUTO: 19.6 PG (ref 26–35)
MCHC RBC AUTO-ENTMCNC: 28.9 % (ref 32–34.5)
MCV RBC AUTO: 67.7 FL (ref 80–99.9)
MONOCYTES ABSOLUTE: 0.52 E9/L (ref 0.1–0.95)
MONOCYTES RELATIVE PERCENT: 5.2 % (ref 2–12)
NEUTROPHILS ABSOLUTE: 7.7 E9/L (ref 1.8–7.3)
NEUTROPHILS RELATIVE PERCENT: 73.9 % (ref 43–80)
NITRITE, URINE: NEGATIVE
OVALOCYTES: ABNORMAL
PDW BLD-RTO: 23 FL (ref 11.5–15)
PH UA: 6 (ref 5–9)
PHOSPHORUS: 4 MG/DL (ref 2.5–4.5)
PLATELET # BLD: 297 E9/L (ref 130–450)
PMV BLD AUTO: 9.7 FL (ref 7–12)
POIKILOCYTES: ABNORMAL
POLYCHROMASIA: ABNORMAL
POTASSIUM SERPL-SCNC: 3.4 MMOL/L (ref 3.5–5)
PROTEIN UA: NEGATIVE MG/DL
PROTHROMBIN TIME: 14.6 SEC (ref 9.3–12.4)
RBC # BLD: 3.78 E12/L (ref 3.5–5.5)
RBC UA: ABNORMAL /HPF (ref 0–2)
SODIUM BLD-SCNC: 136 MMOL/L (ref 132–146)
SPECIFIC GRAVITY UA: <=1.005 (ref 1–1.03)
T4 FREE: 1.24 NG/DL (ref 0.93–1.7)
TARGET CELLS: ABNORMAL
TEAR DROP CELLS: ABNORMAL
TOTAL PROTEIN: 6.1 G/DL (ref 6.4–8.3)
TROPONIN, HIGH SENSITIVITY: 256 NG/L (ref 0–9)
TROPONIN, HIGH SENSITIVITY: 258 NG/L (ref 0–9)
TSH SERPL DL<=0.05 MIU/L-ACNC: 2.58 UIU/ML (ref 0.27–4.2)
UROBILINOGEN, URINE: 0.2 E.U./DL
WBC # BLD: 10.4 E9/L (ref 4.5–11.5)
WBC UA: ABNORMAL /HPF (ref 0–5)

## 2022-09-22 PROCEDURE — 84439 ASSAY OF FREE THYROXINE: CPT

## 2022-09-22 PROCEDURE — APPSS60 APP SPLIT SHARED TIME 46-60 MINUTES: Performed by: PHYSICIAN ASSISTANT

## 2022-09-22 PROCEDURE — 83735 ASSAY OF MAGNESIUM: CPT

## 2022-09-22 PROCEDURE — 6370000000 HC RX 637 (ALT 250 FOR IP): Performed by: INTERNAL MEDICINE

## 2022-09-22 PROCEDURE — 6360000002 HC RX W HCPCS: Performed by: PHYSICIAN ASSISTANT

## 2022-09-22 PROCEDURE — 97530 THERAPEUTIC ACTIVITIES: CPT | Performed by: PHYSICAL THERAPIST

## 2022-09-22 PROCEDURE — 6360000002 HC RX W HCPCS: Performed by: INTERNAL MEDICINE

## 2022-09-22 PROCEDURE — C8929 TTE W OR WO FOL WCON,DOPPLER: HCPCS

## 2022-09-22 PROCEDURE — 84443 ASSAY THYROID STIM HORMONE: CPT

## 2022-09-22 PROCEDURE — 84100 ASSAY OF PHOSPHORUS: CPT

## 2022-09-22 PROCEDURE — 99222 1ST HOSP IP/OBS MODERATE 55: CPT | Performed by: INTERNAL MEDICINE

## 2022-09-22 PROCEDURE — 1200000000 HC SEMI PRIVATE

## 2022-09-22 PROCEDURE — C9113 INJ PANTOPRAZOLE SODIUM, VIA: HCPCS | Performed by: INTERNAL MEDICINE

## 2022-09-22 PROCEDURE — 85025 COMPLETE CBC W/AUTO DIFF WBC: CPT

## 2022-09-22 PROCEDURE — 97161 PT EVAL LOW COMPLEX 20 MIN: CPT | Performed by: PHYSICAL THERAPIST

## 2022-09-22 PROCEDURE — 36415 COLL VENOUS BLD VENIPUNCTURE: CPT

## 2022-09-22 PROCEDURE — 81001 URINALYSIS AUTO W/SCOPE: CPT

## 2022-09-22 PROCEDURE — 85018 HEMOGLOBIN: CPT

## 2022-09-22 PROCEDURE — 85610 PROTHROMBIN TIME: CPT

## 2022-09-22 PROCEDURE — 85014 HEMATOCRIT: CPT

## 2022-09-22 PROCEDURE — 51798 US URINE CAPACITY MEASURE: CPT

## 2022-09-22 PROCEDURE — 2580000003 HC RX 258: Performed by: INTERNAL MEDICINE

## 2022-09-22 PROCEDURE — 80053 COMPREHEN METABOLIC PANEL: CPT

## 2022-09-22 PROCEDURE — 6360000004 HC RX CONTRAST MEDICATION: Performed by: INTERNAL MEDICINE

## 2022-09-22 PROCEDURE — 84484 ASSAY OF TROPONIN QUANT: CPT

## 2022-09-22 RX ORDER — POTASSIUM CHLORIDE 20 MEQ/1
20 TABLET, EXTENDED RELEASE ORAL ONCE
Status: COMPLETED | OUTPATIENT
Start: 2022-09-22 | End: 2022-09-22

## 2022-09-22 RX ORDER — SPIRONOLACTONE 25 MG/1
25 TABLET ORAL EVERY MORNING
Status: DISCONTINUED | OUTPATIENT
Start: 2022-09-22 | End: 2022-09-26

## 2022-09-22 RX ORDER — LANOLIN ALCOHOL/MO/W.PET/CERES
50 CREAM (GRAM) TOPICAL DAILY
Status: DISCONTINUED | OUTPATIENT
Start: 2022-09-22 | End: 2022-09-27 | Stop reason: HOSPADM

## 2022-09-22 RX ORDER — FUROSEMIDE 10 MG/ML
20 INJECTION INTRAMUSCULAR; INTRAVENOUS 2 TIMES DAILY
Status: COMPLETED | OUTPATIENT
Start: 2022-09-22 | End: 2022-09-22

## 2022-09-22 RX ADMIN — GABAPENTIN 100 MG: 100 CAPSULE ORAL at 10:47

## 2022-09-22 RX ADMIN — Medication 10 ML: at 22:08

## 2022-09-22 RX ADMIN — GABAPENTIN 100 MG: 100 CAPSULE ORAL at 22:07

## 2022-09-22 RX ADMIN — MEMANTINE HYDROCHLORIDE 10 MG: 10 TABLET ORAL at 22:07

## 2022-09-22 RX ADMIN — PERFLUTREN 1.65 MG: 6.52 INJECTION, SUSPENSION INTRAVENOUS at 09:24

## 2022-09-22 RX ADMIN — FUROSEMIDE 20 MG: 10 INJECTION, SOLUTION INTRAMUSCULAR; INTRAVENOUS at 13:16

## 2022-09-22 RX ADMIN — POTASSIUM CHLORIDE 20 MEQ: 1500 TABLET, EXTENDED RELEASE ORAL at 18:51

## 2022-09-22 RX ADMIN — PANTOPRAZOLE SODIUM 40 MG: 40 INJECTION, POWDER, FOR SOLUTION INTRAVENOUS at 10:47

## 2022-09-22 RX ADMIN — MEMANTINE HYDROCHLORIDE 10 MG: 10 TABLET ORAL at 10:47

## 2022-09-22 RX ADMIN — FUROSEMIDE 20 MG: 10 INJECTION, SOLUTION INTRAMUSCULAR; INTRAVENOUS at 18:51

## 2022-09-22 RX ADMIN — Medication 10 MG: at 22:07

## 2022-09-22 RX ADMIN — SODIUM CHLORIDE 125 MG: 9 INJECTION, SOLUTION INTRAVENOUS at 10:47

## 2022-09-22 RX ADMIN — SPIRONOLACTONE 25 MG: 25 TABLET ORAL at 10:47

## 2022-09-22 RX ADMIN — Medication 10 ML: at 10:47

## 2022-09-22 RX ADMIN — BUSPIRONE HYDROCHLORIDE 10 MG: 10 TABLET ORAL at 10:46

## 2022-09-22 RX ADMIN — GABAPENTIN 100 MG: 100 CAPSULE ORAL at 13:16

## 2022-09-22 RX ADMIN — DULOXETINE HYDROCHLORIDE 30 MG: 30 CAPSULE, DELAYED RELEASE ORAL at 10:46

## 2022-09-22 RX ADMIN — PYRIDOXINE HCL TAB 50 MG 50 MG: 50 TAB at 18:51

## 2022-09-22 RX ADMIN — DONEPEZIL HYDROCHLORIDE 10 MG: 5 TABLET, FILM COATED ORAL at 22:07

## 2022-09-22 RX ADMIN — QUETIAPINE FUMARATE 12.5 MG: 25 TABLET ORAL at 22:06

## 2022-09-22 RX ADMIN — BUSPIRONE HYDROCHLORIDE 10 MG: 10 TABLET ORAL at 22:07

## 2022-09-22 RX ADMIN — LEVOTHYROXINE SODIUM 75 MCG: 0.07 TABLET ORAL at 10:46

## 2022-09-22 RX ADMIN — PANTOPRAZOLE SODIUM 40 MG: 40 INJECTION, POWDER, FOR SOLUTION INTRAVENOUS at 22:06

## 2022-09-22 ASSESSMENT — LIFESTYLE VARIABLES: SMOKING_STATUS: 0

## 2022-09-22 ASSESSMENT — PAIN SCALES - WONG BAKER
WONGBAKER_NUMERICALRESPONSE: 2
WONGBAKER_NUMERICALRESPONSE: 2

## 2022-09-22 ASSESSMENT — PAIN SCALES - GENERAL: PAINLEVEL_OUTOF10: 0

## 2022-09-22 NOTE — PROGRESS NOTES
Internal Medicine Progress Note    [facility]    Staci Cardozo. Oscar Garza., [email or web address]. Leonel Fam D.O., CHARLEEN Baker D.O. Matt Kaplan, MSN, APRN, NP-C  Rula Duncan. Kyung Hampton, MSN, APRN-CNP       Primary Care Physician: Denis Pacheco DO   Admitting Physician:  Linwood Barrett DO  Admission date and time: 9/21/2022  9:18 AM    Room:  [facility]0421-01  Admitting diagnosis: Acute blood loss anemia [D62]    Patient Name: Marie Hutchinson  MRN: [id number]    Date of Service: 9/22/2022     Subjective:  Brittney Ocampo is a [de-identified] y.o. female who was seen and examined today,9/22/2022, at the bedside. Patient appear to be clinically improved. Denies specific complaints. Very poor understanding of why she is in the hospital.  I suspect she does have underlying dementia. Hemoglobin has improved since transfusion. She denies any pain although troponin level is elevated. Multiple consultants are present    No family present during my examination. Review of System:   Constitutional:   Denies fever or chills, weight loss or gain, fatigue or malaise. HEENT:   Denies ear pain, sore throat, sinus or eye problems. Cardiovascular:   Denies any chest pain, irregular heartbeats, or palpitations. Respiratory:   Denies shortness of breath, coughing, sputum production, hemoptysis, or wheezing. Gastrointestinal:   Denies nausea, vomiting, diarrhea, or constipation. Denies any abdominal pain. Genitourinary:    Denies any urgency, frequency, hematuria. Voiding  without difficulty. Extremities:   Denies lower extremity swelling, edema or cyanosis. Neurology:    Denies any headache or focal neurological deficits, Denies generalized weakness or memory difficulty. Psch:   Denies being anxious or depressed. Musculoskeletal:    Denies  myalgias, joint complaints or back pain.    Integumentary:   Denies any rashes, ulcers, or excoriations. Denies bruising. Hematologic/Lymphatic:  Denies bruising or bleeding. Physical Exam:  I/O this shift: In: 0   Out: 2300 [Urine:2300]    Intake/Output Summary (Last 24 hours) at 9/22/2022 1758  Last data filed at 9/22/2022 1255  Gross per 24 hour   Intake 796.66 ml   Output 5877 ml   Net -5080.34 ml   I/O last 3 completed shifts: In: 796.7 [Blood:796.7]  Out: 8562 [AYOUE:5431]  Patient Vitals for the past 96 hrs (Last 3 readings):   Weight   09/21/22 1543 180 lb (81.6 kg)   09/21/22 0944 180 lb 8 oz (81.9 kg)     Vital Signs:   Blood pressure 101/62, pulse 87, temperature 98.2 °F (36.8 °C), temperature source Oral, resp. rate 17, height 5' 8\" (1.727 m), weight 180 lb (81.6 kg), SpO2 94 %, not currently breastfeeding. General appearance:  Alert, responsive, oriented to person, place, and time. Well preserved, alert, no distress. Head:  Normocephalic. No masses, lesions or tenderness. Eyes:  PERRLA. EOMI. Sclera clear. Buccal mucosa moist.  ENT:  Ears normal. Mucosa normal.  Neck:    Supple. Trachea midline. No thyromegaly. No JVD. No bruits. Heart:    Rhythm regular. Rate controlled. 1/6 systolic ejection murmur murmurs. Lungs:    Symmetrical. Clear to auscultation bilaterally. No wheezes. No rhonchi. No rales. Abdomen:   Soft. Non-tender. Non-distended. Bowel sounds positive. No organomegaly or masses. No pain on palpation. Extremities:    Peripheral pulses present. No peripheral edema. No ulcers. No cyanosis. No clubbing. Neurologic:    Alert x 3. No focal deficit. Cranial nerves grossly intact. No focal weakness. Psych:   Behavior is normal. Mood appears normal. Speech is not rapid and/or pressured. Musculoskeletal:   Spine ROM normal. Muscular strength intact. Gait not assessed. Integumentary:  No rashes  Skin normal color and texture.   Genitalia/Breast:  Deferred    Medication:  Scheduled Meds:   spironolactone  25 mg Oral QAM    ferric gluconate (FERRLECIT) IVPB  125 mg IntraVENous Daily    furosemide  20 mg IntraVENous BID    pantoprazole  40 mg IntraVENous BID    sodium chloride flush  5-40 mL IntraVENous 2 times per day    busPIRone  10 mg Oral BID    donepezil  10 mg Oral Nightly    DULoxetine  30 mg Oral QAM    gabapentin  100 mg Oral TID    levothyroxine  75 mcg Oral QAM    melatonin  10 mg Oral Nightly    memantine  10 mg Oral BID    QUEtiapine  12.5 mg Oral Nightly     Continuous Infusions:   dextrose      sodium chloride      sodium chloride         Objective Data:  Recent Labs     09/21/22  0955 09/21/22  1933 09/22/22  0621 09/22/22  1545   WBC 10.2  --  10.4  --    RBC 3.07*  --  3.78  --    HGB 5.0* 6.0* 7.4* 7.9*   HCT 19.4* 21.8* 25.6* 27.4*   MCV 63.2*  --  67.7*  --    MCH 16.3*  --  19.6*  --    MCHC 25.8*  --  28.9*  --    RDW 19.3*  --  23.0*  --      --  297  --    MPV 10.0  --  9.7  --      Lab Results   Component Value Date/Time     09/22/2022 06:21 AM    K 3.4 09/22/2022 06:21 AM    K 4.1 09/21/2022 09:55 AM     09/22/2022 06:21 AM    CO2 24 09/22/2022 06:21 AM    ANIONGAP 9 09/22/2022 06:21 AM    GLUCOSE 95 09/22/2022 06:21 AM    BUN 17 09/22/2022 06:21 AM    CREATININE 0.7 09/22/2022 06:21 AM    GFRAA >60 09/22/2022 06:21 AM    LABGLOM >60 09/22/2022 06:21 AM    CALCIUM 8.8 09/22/2022 06:21 AM    BILITOT 1.6 09/22/2022 06:21 AM    ALT 14 09/22/2022 06:21 AM    AST 26 09/22/2022 06:21 AM    ALKPHOS 127 09/22/2022 06:21 AM    PROT 6.1 09/22/2022 06:21 AM    LABALBU 3.4 09/22/2022 06:21 AM     Recent Labs     09/22/22  0621   MG 2.2      Lab Results   Component Value Date/Time    PHOS 4.0 09/22/2022 06:21 AM     No results for input(s): TROPONINI in the last 72 hours.     Wound Documentation:   Incision 12/01/15 Abdomen (Active)   Number of days: 2486       Assessment:    Severe microcytic anemia probably on the basis of iron deficiency or B6, rule out underlying malignancy  Elevated troponin possibly secondary demand ischemia  Hypoxemic respiratory failure complicated by high output state and anemia  Hypothyroidism on replacement therapy  Essential hypertension  Hyperlipidemia  Underlying dementia on Aricept and Namenda  History of tobacco abuse      Plan:       Transfuse accordingly. Serial monitor hemoglobin hematocrit  Trend troponin. Cardiology consult  Plan for possible endoscopy  Treat chronic comorbidity  Echocardiogram pending  Follow other subspecialists      More than 50% of my time was spent at the bedside counseling/coordinating care with the patient and/or family with face to face contact. This time was spent reviewing notes and laboratory data as well as instructing and counseling the patient. Time I spent with the family or surrogate(s) is included only if the patient was incapable of providing the necessary information or participating in medical decisions. I also discussed the differential diagnosis and all of the proposed management plans with the patient and individuals accompanying the patient. I am readily available for any further decision-making and intervention.        Isauro Almanza DO, F.A.C.O.I.  9/22/2022  5:58 PM

## 2022-09-22 NOTE — ANESTHESIA PRE PROCEDURE
Department of Anesthesiology  Preprocedure Note       Name:  Shraddha Simon   Age:  [de-identified] y.o.  :  1940                                          MRN:  [id number]         Date:  2022      Surgeon: Lacey Najera):  Heladio Alarcon DO    Procedure: Procedure(s):  EGD ESOPHAGOGASTRODUODENOSCOPY    Medications prior to admission:   Prior to Admission medications    Medication Sig Start Date End Date Taking? Authorizing Provider   busPIRone (BUSPAR) 10 MG tablet Take 10 mg by mouth in the morning and at bedtime   Yes Historical Provider, MD   donepezil (ARICEPT) 10 MG tablet Take 10 mg by mouth nightly   Yes Historical Provider, MD   Calcium Carb-Cholecalciferol (CALCIUM+D3) 600-800 MG-UNIT TABS Take 1 tablet by mouth every morning   Yes Historical Provider, MD   DULoxetine (CYMBALTA) 30 MG extended release capsule Take 30 mg by mouth every morning   Yes Historical Provider, MD   gabapentin (NEURONTIN) 100 MG capsule Take 100 mg by mouth 3 times daily.    Yes Historical Provider, MD   loperamide (IMODIUM) 2 MG capsule Take 2 mg by mouth every 4 hours as needed for Diarrhea Do not exceed 8 mg daily   Yes Historical Provider, MD   loperamide (IMODIUM) 2 MG capsule Take 2 mg by mouth three times a week    Yes Historical Provider, MD   Melatonin 10 MG TABS Take 1 tablet by mouth at bedtime   Yes Historical Provider, MD   guaiFENesin 400 MG tablet Take 400 mg by mouth every 12 hours as needed for Cough (Congestion)   Yes Historical Provider, MD   spironolactone (ALDACTONE) 25 MG tablet Take 25 mg by mouth every morning   Yes Historical Provider, MD   memantine (NAMENDA) 10 MG tablet Take 10 mg by mouth 2 times daily   Yes Historical Provider, MD   simvastatin (ZOCOR) 40 MG tablet Take 40 mg by mouth nightly   Yes Historical Provider, MD   QUEtiapine (SEROQUEL) 12.5 MG TABS Take 12.5 mg by mouth nightly   Yes Historical Provider, MD   levothyroxine (SYNTHROID) 75 MCG tablet Take 75 mcg by mouth every morning   Yes Historical Provider, MD   Multiple Vitamin (MULTI-DAY PO) Take 1 tablet by mouth every morning    Historical Provider, MD   acetaminophen (TYLENOL) 325 MG tablet Take 650 mg by mouth every 6 hours as needed for Pain or Fever    Historical Provider, MD   Probiotic Product (PROBIOTIC DAILY PO) Take 1 capsule by mouth every morning    Historical Provider, MD   aspirin EC 81 MG EC tablet Take 1 tablet by mouth daily.     Rip Cards, DO       Current medications:    Current Facility-Administered Medications   Medication Dose Route Frequency Provider Last Rate Last Admin    spironolactone (ALDACTONE) tablet 25 mg  25 mg Oral QAM Ismail U Dawson, DO   25 mg at 09/22/22 1047    ferric gluconate (FERRLECIT) 125 mg in sodium chloride 0.9 % 100 mL IVPB  125 mg IntraVENous Daily Dora Brenner, DO   Stopped at 09/22/22 1255    furosemide (LASIX) injection 20 mg  20 mg IntraVENous BID Pj Bourne PA-C        pantoprazole (PROTONIX) injection 40 mg  40 mg IntraVENous BID Abbie Crooked, DO   40 mg at 09/22/22 1047    glucose chewable tablet 16 g  4 tablet Oral PRN Abbie Crooked, DO        dextrose bolus 10% 125 mL  125 mL IntraVENous PRN Abbie Crooked, DO        Or    dextrose bolus 10% 250 mL  250 mL IntraVENous PRN Abbie Crooked, DO        glucagon (rDNA) injection 1 mg  1 mg SubCUTAneous PRN Abbie Crooked, DO        dextrose 10 % infusion   IntraVENous Continuous PRN Abbie Crooked, DO        sodium chloride flush 0.9 % injection 5-40 mL  5-40 mL IntraVENous 2 times per day Abbie Crooked, DO   10 mL at 09/22/22 1047    sodium chloride flush 0.9 % injection 5-40 mL  5-40 mL IntraVENous PRN Abbie Crooked, DO        0.9 % sodium chloride infusion   IntraVENous PRN Abbie Crooked, DO        polyethylene glycol (GLYCOLAX) packet 17 g  17 g Oral Daily PRN Abbie Crooked, DO        acetaminophen (TYLENOL) tablet 650 mg  650 mg Oral Q6H PRN Ismail U Dawson, DO   650 mg at 09/21/22 1825    Or    acetaminophen (TYLENOL) suppository 650 mg  650 mg Rectal Q6H PRN Jay [place], DO        busPIRone (BUSPAR) tablet 10 mg  10 mg Oral BID Jay [place], DO   10 mg at 09/22/22 1046    donepezil (ARICEPT) tablet 10 mg  10 mg Oral Nightly Jay [place], DO   10 mg at 09/21/22 1949    DULoxetine (CYMBALTA) extended release capsule 30 mg  30 mg Oral QAM Jay [place], DO   30 mg at 09/22/22 1046    gabapentin (NEURONTIN) capsule 100 mg  100 mg Oral TID Jay [place], DO   100 mg at 09/22/22 1047    levothyroxine (SYNTHROID) tablet 75 mcg  75 mcg Oral QAM Jay [place], DO   75 mcg at 09/22/22 1046    melatonin disintegrating tablet 10 mg  10 mg Oral Nightly Jay [place], DO   10 mg at 09/21/22 1949    memantine (NAMENDA) tablet 10 mg  10 mg Oral BID Jay [place], DO   10 mg at 09/22/22 1047    haloperidol lactate (HALDOL) injection 5 mg  5 mg IntraMUSCular Nightly PRN Jay [place], DO        ipratropium-albuterol (DUONEB) nebulizer solution 1 ampule  1 ampule Inhalation Q4H PRN Jay [place], DO        QUEtiapine (SEROQUEL) tablet 12.5 mg  12.5 mg Oral Nightly Jay [place], DO   12.5 mg at 09/21/22 2220    0.9 % sodium chloride infusion   IntraVENous PRN Jay [place], DO           Allergies:  No Known Allergies    Problem List:    Patient Active Problem List   Diagnosis Code    Memory change R41.3    Hyperlipidemia E78.5    Hypertension I10    Neuropathy G62.9    Hypothyroid E03.9    H/O total hysterectomy Z90.710    Calculus of gallbladder without cholecystitis without obstruction K80.20    Left inguinal hernia K40.90    Late onset Alzheimer's disease without behavioral disturbance (HCC) G30.1, F02.80    Acute blood loss anemia D62       Past Medical History:        Diagnosis Date    Alzheimer disease ([facility] [address]     Hyperlipidemia     Hypertension        Past Surgical History:        Procedure Laterality Date    COLONOSCOPY      ENDOSCOPY, COLON, DIAGNOSTIC      HERNIA REPAIR Left 2015    femoral    HYSTERECTOMY (CERVIX STATUS UNKNOWN)      JOINT REPLACEMENT Bilateral        Social History:    Social History     Tobacco Use    Smoking status: Former     Packs/day: 0.50     Years: 31.00     Pack years: 15.50     Types: Cigarettes     Start date: 1961     Quit date: 8/3/1992     Years since quittin.1    Smokeless tobacco: Never   Substance Use Topics    Alcohol use: Not Currently     Alcohol/week: 0.0 standard drinks                                Counseling given: Not Answered      Vital Signs (Current):   Vitals:    22 2323 22 0300 22 0800 22 0845   BP: 100/63 107/68  101/62   Pulse: 88 88  87   Resp: 16 16  17   Temp: 36.9 °C (98.4 °F) 36.3 °C (97.3 °F)  36.8 °C (98.2 °F)   TempSrc: Oral Oral  Oral   SpO2: 95% 96% 97% 94%   Weight:       Height:                                                  BP Readings from Last 3 Encounters:   22 101/62   20 110/68   19 124/70       NPO Status:                                                                                 BMI:   Wt Readings from Last 3 Encounters:   22 180 lb (81.6 kg)   20 123 lb (55.8 kg)   19 130 lb (59 kg)     Body mass index is 27.37 kg/m².     CBC:   Lab Results   Component Value Date/Time    WBC 10.4 2022 06:21 AM    RBC 3.78 2022 06:21 AM    HGB 7.4 2022 06:21 AM    HCT 25.6 2022 06:21 AM    MCV 67.7 2022 06:21 AM    RDW 23.0 2022 06:21 AM     2022 06:21 AM       CMP:   Lab Results   Component Value Date/Time     2022 06:21 AM    K 3.4 2022 06:21 AM    K 4.1 2022 09:55 AM     2022 06:21 AM    CO2 24 2022 06:21 AM    BUN 17 2022 06:21 AM    CREATININE 0.7 2022 06:21 AM    GFRAA >60 2022 06:21 AM    LABGLOM >60 2022 06:21 AM    GLUCOSE 95 2022 06:21 AM    PROT 6.1 2022 06:21 AM CALCIUM 8.8 09/22/2022 06:21 AM    BILITOT 1.6 09/22/2022 06:21 AM    ALKPHOS 127 09/22/2022 06:21 AM    AST 26 09/22/2022 06:21 AM    ALT 14 09/22/2022 06:21 AM       POC Tests: No results for input(s): POCGLU, POCNA, POCK, POCCL, POCBUN, POCHEMO, POCHCT in the last 72 hours. Coags:   Lab Results   Component Value Date/Time    PROTIME 14.6 09/22/2022 06:21 AM    INR 1.3 09/22/2022 06:21 AM       HCG (If Applicable): No results found for: PREGTESTUR, PREGSERUM, HCG, HCGQUANT     ABGs: No results found for: PHART, PO2ART, AQR6OKI, ZJJ2HEE, BEART, X4DBJTXI     Type & Screen (If Applicable):  No results found for: LABABO, LABRH    Drug/Infectious Status (If Applicable):  No results found for: HIV, HEPCAB    COVID-19 Screening (If Applicable):   Lab Results   Component Value Date/Time    COVID19 Not Detected 09/21/2022 09:55 AM           Anesthesia Evaluation  Patient summary reviewed and Nursing notes reviewed no history of anesthetic complications:   Airway: Mallampati: II  TM distance: >3 FB   Neck ROM: full  Mouth opening: > = 3 FB   Dental:    (+) upper dentures      Pulmonary:normal exam  breath sounds clear to auscultation      (-) not a current smoker                           Cardiovascular:    (+) hypertension:, hyperlipidemia        Rhythm: regular  Rate: normal           Beta Blocker:  Not on Beta Blocker         Neuro/Psych:   (+) dementia            GI/Hepatic/Renal: Neg GI/Hepatic/Renal ROS            Endo/Other:    (+) hypothyroidism, blood dyscrasia: anemia:., .                 Abdominal:   (+) obese,     Abdomen: soft. Vascular: negative vascular ROS. Other Findings:      EKG 9/21/22  Normal sinus rhythm  Possible Left atrial enlargement  Low voltage QRS  Poor R wave progression     Anesthesia Plan      MAC     ASA 3       Induction: intravenous. Anesthetic plan and risks discussed with patient and child/children.                         Terrence Reynolds RN   9/22/2022

## 2022-09-22 NOTE — CARE COORDINATION
Ss note:9/22/202212:18 PM Neg covid on 9-21-22. Pt presents from [facility] at [address] [place] [address]. Therapy has been ordered and liaison relays pt can return to facility as long as NOT on IV Ferrlecit which is ordered till 9-24-22. Pt is on room air, dx of dementia. 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 Sw will follow.  SUSANA Dahl

## 2022-09-22 NOTE — PROGRESS NOTES
Physical Therapy Initial Evaluation/Plan of Care    Room #:  3953/3501-84  Patient Name: Sita Morel  YOB: 1940  MRN: [id number]    Date of Service: 9/22/2022     Tentative placement recommendation: [address] with PT  Equipment recommendation: To be determined      Evaluating Physical Therapist: Audelia Cuadra, PT #[id number]      Specific Provider Orders/Date/Referring Provider :  09/22/22 0830    PT eval and treat  Start:  09/22/22 0830,   End:  09/22/22 0830,   ONE TIME,   Standing Count:  1 Occurrences,   R         Anette Brenner DO     Admitting Diagnosis:   Acute blood loss anemia [D62]     chest tightness and shortness of breath  Surgery: none      Patient Active Problem List   Diagnosis    Memory change    Hyperlipidemia    Hypertension    Neuropathy    Hypothyroid    H/O total hysterectomy    Calculus of gallbladder without cholecystitis without obstruction    Left inguinal hernia    Late onset Alzheimer's disease without behavioral disturbance (HCC)    Acute blood loss anemia        ASSESSMENT of Current Deficits Patient exhibits decreased strength, balance, and endurance impairing functional mobility, transfers, gait , gait distance, and tolerance to activity increased shortness of breath and audible wheezing during gait, slight decreased o2 saturation, improves with rest and cues purse lip breathing, nursing notified. Pleasantly confused, able to follow directions. Patient requires continued skilled physical therapy to address concerns listed above for increased safety and function at discharge.          PHYSICAL THERAPY  PLAN OF CARE       Physical therapy plan of care is established based on physician order,  patient diagnosis and clinical assessment    Current Treatment Recommendations:    -Bed Mobility: Lower extremity exercises   -Sitting Balance: Incorporate reaching activities to activate trunk muscles   -Standing Balance: Perform strengthening exercises in standing to promote motor control with or without upper extremity support   -Transfers: Provide instruction on proper hand and foot position for adequate transfer of weight onto lower extremities and use of gait device if needed and Cues for hand placement, technique and safety. Provide stabilization to prevent fall   -Gait: Gait training, Standing activities to improve: base of support, weight shift, weight bearing , and Pregait training to emphasize: Upright, Safety, and se of assistive device as needed   -Endurance: Utilize Supervised activities to increase level of endurance to allow for safe functional mobility including transfers and gait     PT long term treatment goals are located in below grid    Patient and or family understand(s) diagnosis, prognosis, and plan of care. Frequency of treatments: Patient will be seen  daily. Prior Level of Function: Patient ambulated independently    Rehab Potential: good - for baseline    Past medical history:   Past Medical History:   Diagnosis Date    Alzheimer disease ([facility] [address]     Hyperlipidemia     Hypertension      Past Surgical History:   Procedure Laterality Date    COLONOSCOPY      ENDOSCOPY, COLON, DIAGNOSTIC      HERNIA REPAIR Left 12/1/2015    femoral    HYSTERECTOMY (CERVIX STATUS UNKNOWN)      JOINT REPLACEMENT Bilateral        SUBJECTIVE:    Precautions:  Up with assistance, falls and alarm ,  Alzheimer's     Social history: Patient lives in an assisted living facility   Equipment owned: None,       [address] [place] [address],[address] Mobility Inpatient   How much difficulty turning over in bed?: A Little  How much difficulty sitting down on / standing up from a chair with arms?: A Little  How much difficulty moving from lying on back to sitting on side of bed?: A Little  How much help from another person moving to and from a bed to a chair?: A Little  How much help from another person needed to walk in hospital room?: A Little  How much help from another person for climbing 3-5 steps with a railing?: A Lot  AM-PAC Inpatient Mobility Raw Score : 17  AM-PAC Inpatient T-Scale Score : 42.13  Mobility Inpatient CMS 0-100% Score: 50.57  Mobility Inpatient CMS G-Code Modifier : CK    Nursing cleared patient for PT evaluation. The admitting diagnosis and active problem list as listed above have been reviewed prior to the initiation of this evaluation. OBJECTIVE;   Initial Evaluation  Date: 9/22/2022 Treatment Date:     Short Term/ Long Term   Goals   Was pt agreeable to Eval/treatment? Yes  To be met in 3 days   Pain level   0/10        Bed Mobility    Rolling: Not assessed     Supine to sit: Minimal assist of 1    Sit to supine: Not assessed     Scooting: Minimal assist of 1    Rolling: Independent    Supine to sit:  Independent    Sit to supine: Independent    Scooting: Independent     Transfers Sit to stand: Minimal assist of 1 Cues for hand placement and safety   Sit to stand: Independent     Ambulation     50 feet using  hand held assist with Minimal assist of 1   for balance and cues for safety and pacing    100 feet using  least restrictive device versus no device with Independent    Stair negotiation: ascended and descended   Not assessed          ROM Within functional limits    Increase range of motion 10% of affected joints    Strength BUE:  refer to OT eval  RLE:  3+/5  LLE:  3+/5  Increase strength in affected mm groups by 1/3 grade   Balance Sitting EOB:  good -  Dynamic Standing:  fair    Sitting EOB:  good    Dynamic Standing: good       Patient is Alert & Oriented x person and time and follows directions    Sensation:  Patient  denies numbness/tingling   Edema:  no   Endurance: fair  +    Vitals: room air   Blood Pressure at rest  Blood Pressure during session    Heart Rate at rest 95 Heart Rate during session 106-110   SPO2 at rest 92%  SPO2 during session 87-90% cues purse lip breathing      Patient education  Patient educated on role of Physical Therapy, risks of immobility, safety and plan of care,  importance of mobility while in hospital , purse lip breathing, and ankle pumps, quad set and glut set for edema control, blood clot prevention     Patient response to education:   Pt verbalized understanding Pt demonstrated skill Pt requires further education in this area   Yes Partial Yes      Treatment:  Patient practiced and was instructed/facilitated in the following treatment: Patient assisted to edge of bed,  Sat edge of bed 10 minutes with Supervision  to increase dynamic sitting balance and activity tolerance. Perfomred exercises, ambulated in hallway, assisted up to chair. Therapeutic Exercises:  ankle pumps and long arc quad  x 15 reps. At end of session, patient in chair with nursing present call light and phone within reach,  all lines and tubes intact, nursing notified. Patient would benefit from continued skilled Physical Therapy to improve functional independence and quality of life. Patient's/ family goals   Return to [address]    Time in  1023  Time out  1054    Total Treatment Time  11 minutes    Evaluation time includes thorough review of current medical information, gathering information on past medical history/social history and prior level of function, completion of standardized testing/informal observation of tasks, assessment of data, and development of Plan of care and goals.      CPT codes:  Low Complexity PT evaluation (02282)  Therapeutic activities (55360)   11 minutes  1 unit(s)    Ervin Vasquez, PT

## 2022-09-22 NOTE — PROGRESS NOTES
PS message sent to Dr. George Galvan to see if this patient cardiac cleared for EGD via PS. Waiting for response.

## 2022-09-22 NOTE — PROGRESS NOTES
Physical Therapy    Room #: 0421/0421-01  Date: 2022       Patient Name: Keaton Abrams  : 1940      MRN: [id number]     Patient unavailable for physical therapy eval due to off floor at [facility]. Will attempt PT evaluation at a later time. Thank you.        Kimberly Sierra, PT

## 2022-09-22 NOTE — CONSULTS
Inpatient [id number] [place] [address] Cardiology Consultation      Reason for Consult: Elevated troponin    Consulting Physician: Dr. Nisha Garcia    Requesting Physician: Dr. Vel Glass    Date of Consultation: 9/22/2022    HISTORY OF PRESENT ILLNESS:   Patient is an [de-identified]year old WF not previously known to [address] [place] [address] Cardiology. She is being seen in consultation this hospital admission by Dr. iNsha Garcia for evaluation and recommendations regarding elevated troponin. PMH: HTN, HLD, anxiety, dementia, former tobacco smoker, fibromyalgia and hypothyroidism on HRT    Patient presented to [facility] on September 21, 2022 with complaints of chest pain. Of note, patient is a poor historian regarding her reason for hospital presentation. She is alert and oriented to person place and time, but is unable to tell me reason for hospitalization, as well as details regarding her past medical history. No family present at bedside during my time on the floor. As a result, most of history obtained through chart review and discussion with medical staff. Patient reportedly lives at an assisted living facility, with her daughter helping her as well. She reportedly noted of severe fatigue, as well as chest pain and shortness of breath over the last couple days. As a result, patient was brought to the ED for further evaluation  Limited review of systems by patient, but on interview this morning, she denies complaints of chest pain        Please note: past medical records were reviewed per electronic medical record (EMR) - see detailed reports under Past Medical/ Surgical History.    PAST MEDICAL HISTORY:    Former tobacco smoker  Dementia, on Aricept and Namenda therapy  Anxiety   HLD, on statin therapy  HTN  Hypothyroidism, on HRT  Fibromyalgia    PAST SURGICAL HISTORY:    Past Surgical History:   Procedure Laterality Date    COLONOSCOPY      ENDOSCOPY, COLON, DIAGNOSTIC      HERNIA REPAIR Left 12/1/2015    femoral    HYSTERECTOMY (CERVIX STATUS UNKNOWN) JOINT REPLACEMENT Bilateral        HOME MEDICATIONS:  Prior to Admission medications    Medication Sig Start Date End Date Taking? Authorizing Provider   busPIRone (BUSPAR) 10 MG tablet Take 10 mg by mouth in the morning and at bedtime   Yes Historical Provider, MD   donepezil (ARICEPT) 10 MG tablet Take 10 mg by mouth nightly   Yes Historical Provider, MD   Calcium Carb-Cholecalciferol (CALCIUM+D3) 600-800 MG-UNIT TABS Take 1 tablet by mouth every morning   Yes Historical Provider, MD   DULoxetine (CYMBALTA) 30 MG extended release capsule Take 30 mg by mouth every morning   Yes Historical Provider, MD   gabapentin (NEURONTIN) 100 MG capsule Take 100 mg by mouth 3 times daily.    Yes Historical Provider, MD   loperamide (IMODIUM) 2 MG capsule Take 2 mg by mouth every 4 hours as needed for Diarrhea Do not exceed 8 mg daily   Yes Historical Provider, MD   loperamide (IMODIUM) 2 MG capsule Take 2 mg by mouth three times a week Monday Wednesday Friday   Yes Historical Provider, MD   Melatonin 10 MG TABS Take 1 tablet by mouth at bedtime   Yes Historical Provider, MD   guaiFENesin 400 MG tablet Take 400 mg by mouth every 12 hours as needed for Cough (Congestion)   Yes Historical Provider, MD   spironolactone (ALDACTONE) 25 MG tablet Take 25 mg by mouth every morning   Yes Historical Provider, MD   memantine (NAMENDA) 10 MG tablet Take 10 mg by mouth 2 times daily   Yes Historical Provider, MD   simvastatin (ZOCOR) 40 MG tablet Take 40 mg by mouth nightly   Yes Historical Provider, MD   QUEtiapine (SEROQUEL) 12.5 MG TABS Take 12.5 mg by mouth nightly   Yes Historical Provider, MD   levothyroxine (SYNTHROID) 75 MCG tablet Take 75 mcg by mouth every morning   Yes Historical Provider, MD   Multiple Vitamin (MULTI-DAY PO) Take 1 tablet by mouth every morning    Historical Provider, MD   acetaminophen (TYLENOL) 325 MG tablet Take 650 mg by mouth every 6 hours as needed for Pain or Fever    Historical Provider, MD   Probiotic Product (PROBIOTIC DAILY PO) Take 1 capsule by mouth every morning    Historical Provider, MD   aspirin EC 81 MG EC tablet Take 1 tablet by mouth daily.     Rip Kinney, DO       CURRENT MEDICATIONS:      Current Facility-Administered Medications:     perflutren lipid microspheres (DEFINITY) injection 1.65 mg, 1.5 mL, IntraVENous, ONCE PRN, Michael Long, DO    spironolactone (ALDACTONE) tablet 25 mg, 25 mg, Oral, QAM, Ismail U Dawson, DO    pantoprazole (PROTONIX) injection 40 mg, 40 mg, IntraVENous, BID, Ismail U Dawson, DO, 40 mg at 09/21/22 1814    glucose chewable tablet 16 g, 4 tablet, Oral, PRN, Ismail U Dawson, DO    dextrose bolus 10% 125 mL, 125 mL, IntraVENous, PRN **OR** dextrose bolus 10% 250 mL, 250 mL, IntraVENous, PRN, Ismail U Dawson, DO    glucagon (rDNA) injection 1 mg, 1 mg, SubCUTAneous, PRN, Ismail U Dawson, DO    dextrose 10 % infusion, , IntraVENous, Continuous PRN, Ismail U Dawson, DO    sodium chloride flush 0.9 % injection 5-40 mL, 5-40 mL, IntraVENous, 2 times per day, Ismail U Dawson, DO, 10 mL at 09/21/22 1950    sodium chloride flush 0.9 % injection 5-40 mL, 5-40 mL, IntraVENous, PRN, Ismail U Dawson, DO    0.9 % sodium chloride infusion, , IntraVENous, PRN, Ismail U Dawson, DO    polyethylene glycol (GLYCOLAX) packet 17 g, 17 g, Oral, Daily PRN, Michael Long DO    acetaminophen (TYLENOL) tablet 650 mg, 650 mg, Oral, Q6H PRN, 650 mg at 09/21/22 1825 **OR** acetaminophen (TYLENOL) suppository 650 mg, 650 mg, Rectal, Q6H PRN, Michael Long DO    busPIRone (BUSPAR) tablet 10 mg, 10 mg, Oral, BID, Ismail U Dawson, DO, 10 mg at 09/21/22 1949    donepezil (ARICEPT) tablet 10 mg, 10 mg, Oral, Nightly, Ismail U Dawson, DO, 10 mg at 09/21/22 1949    DULoxetine (CYMBALTA) extended release capsule 30 mg, 30 mg, Oral, QAM, Ismail U Dawson,     gabapentin (NEURONTIN) capsule 100 mg, 100 mg, Oral, TID, Ismail U Dawson, , 100 mg at 09/21/22 1949    levothyroxine (SYNTHROID) tablet 75 mcg, 75 mcg, Oral, QAM, Ismail U Dawson, DO    melatonin disintegrating tablet 10 mg, 10 mg, Oral, Nightly, Ismail U Dawson, DO, 10 mg at 09/21/22 1949    memantine (NAMENDA) tablet 10 mg, 10 mg, Oral, BID, Ismail U Dawson, DO, 10 mg at 09/21/22 1950    haloperidol lactate (HALDOL) injection 5 mg, 5 mg, IntraMUSCular, Nightly PRN, Eudelia Shilpa, DO    ipratropium-albuterol (DUONEB) nebulizer solution 1 ampule, 1 ampule, Inhalation, Q4H PRN, Eudelia Shilpa, DO    QUEtiapine (SEROQUEL) tablet 12.5 mg, 12.5 mg, Oral, Nightly, Ismail U Dawson, DO, 12.5 mg at 09/21/22 2220    0.9 % sodium chloride infusion, , IntraVENous, PRN, Eudelia Shilpa, DO      ALLERGIES:  Patient has no known allergies. SOCIAL HISTORY:    Unable to obtain at this time due to patient's baseline confusion    FAMILY HISTORY:   Non-contributory at this time due to patient's advanced age      REVIEW OF SYSTEMS:     Negative except as noted above in HPI      PHYSICAL EXAM:   /68   Pulse 88   Temp 97.3 °F (36.3 °C) (Oral)   Resp 16   Ht 5' 8\" (1.727 m)   Wt 180 lb (81.6 kg)   LMP  (LMP Unknown)   SpO2 96%   BMI 27.37 kg/m²   CONST:  Well developed, well nourished WF who appears stated age. Awake, alert, cooperative, no apparent distress. HEENT:   Head- Normocephalic, atraumatic. Eyes- Conjunctivae pink, anicteric. Neck-  No stridor, trachea midline, no apparent jugular venous distention. CHEST: Chest symmetrical and non-tender to palpation. No accessory muscle use or intercostal retractions. RESPIRATORY: Lung sounds - clear throughout fields. No wheezing, rales or rhonchi. CARDIOVASCULAR:     No noted carotid bruit. Heart Ausculation- Regular rate and rhythm, no apparent murmur. PV: No lower extremity edema. No varicosities. Pedal pulses palpable, no clubbing or cyanosis. ABDOMEN: Soft, non-tender to light palpation. Bowel sounds present. MS: Good muscle strength and tone. No atrophy or abnormal movements. SKIN: Warm and dry. No statis dermatitis or ulcers. NEURO / PSYCH: Oriented to person, place and time. Speech clear and appropriate. Follows all commands. Pleasant affect. DATA:    Telemetry: SR with HR in the 90s    Diagnostic:  All diagnostic testing and lab work thus far this admission reviewed in detail.     CXR 9/21/2022  Impression  No acute process      Intake/Output Summary (Last 24 hours) at 9/22/2022 0737  Last data filed at 9/22/2022 0500  Gross per 24 hour   Intake 796.66 ml   Output 3577 ml   Net -2780.34 ml       Labs:   CBC:   Recent Labs     09/21/22  0955 09/21/22 1933 09/22/22  0621   WBC 10.2  --  10.4   HGB 5.0* 6.0* 7.4*   HCT 19.4* 21.8* 25.6*     --  297     BMP:   Recent Labs     09/21/22  0955      K 4.1   CO2 21*   BUN 25*   CREATININE 0.9   LABGLOM >60   CALCIUM 9.1     HgA1c:   Lab Results   Component Value Date    LABA1C 5.7 (H) 11/18/2019     PT/INR:   Recent Labs     09/21/22 1933 09/22/22  0621   PROTIME 14.9* 14.6*   INR 1.3 1.3     FASTING LIPID PANEL:  Lab Results   Component Value Date/Time    CHOL 135 11/18/2019 12:30 PM    HDL 67 11/18/2019 12:30 PM    LDLCALC 36 11/18/2019 12:30 PM    TRIG 159 11/18/2019 12:30 PM      Ref Range & Units 09/21/22 1135 09/21/22 0955   Troponin, High Sensitivity 0 - 9 ng/L 162 High   135 High  CM      Ref Range & Units 09/21/22 1933   Pro-BNP 0 - 450 pg/mL 8,795 High       Ref Range & Units 09/21/22 0955    SARS-CoV-2, NAAT Not Detected Not Detected       Ref Range & Units 09/21/22 1135   CEA 0.0 - 5.2 ng/mL 3.6        ASSESSMENT:  Elevated troponin  hS-troponin 135 --> 162 --> 258  No acute ST changes on EKG  Likely demand ischemia in setting of severe acute anemia, denies CP to me  Severe acute anemia, Hgb 5.0g s/p two units pRBC  Acute hypoxic respiratory failure, now on RA  HTN, controlled  HLD, on statin therapy  Hypoalbuminemia  Elevated bilirubin  Hypokalemia  Hypothyroidism, on HRT  Former tobacco smoker  Fibromyalgia  Dementia, on Aricept and Namenda therapy  Anxiety      RECOMMENDATIONS:  Trend troponin until peak is reached  Agree with TTE, will review once complete for evaluation of LV/RV function and VHD   IV Lasix 20 mg twice daily for today. We will check BMP tomorrow morning, with likely switch to oral Lasix 20 mg daily  Consider outpatient ischemic evaluation  Continue other cardiac medications the same at this time  Rest as per primary service and other consultants  Above discussed and made in collaboration with Dr. Samara Chu        Further recommendations to follow as per Dr. Samara Chu. NOTE: This report was transcribed using voice recognition software. Every effort was made to ensure accuracy; however, inadvertent computerized transcription errors may be present.       Madhavi Stock, [address] [place] [address] Cardiology    Electronically signed by Dung Malik PA-C on 9/22/2022 at 7:37 AM

## 2022-09-22 NOTE — CONSULTS
Latoya Mix M.D. [facility]  Dr. Charlie Babcock M.D.,  Dr. Latosha Brennan M.D.,  Dr. Lorenza Parker D.O.,  Dr. Karen Valencia D.O. ,  Dr. Tish Castillo M.D.,  Dr. Carlota Veras, Minh Kincaid  [de-identified] y.o.  female      Re: Acute blood loss anemia hemoglobin of 5  Requesting physician: Dr. Mariana Blum, resident emergency f department for Dr. Meg Laws   date:12:05 PM 9/22/2022          HPI: 70-year-old female patient seen in the hospital for above-described issue. Apparently patient presented from a nursing/assisted living facility because of chest pain. Apparently she has been experiencing chest pain since the beginning of the week. She also has increased shortness of breath. She denies fevers. She denies nausea vomiting. She denies hematemesis emesis of coffee-ground material.  She reports previous issues with swallowing however cannot provide details. She reports previous upper endoscopy and colonoscopy however does not remember. Patient does not recall blood in the stool, blood in the urine, she has not noticed any dark stool. Patient denies nausea vomiting. Upon presentation she was noted to be anemic with hemoglobin of 5 compared to hemoglobin of 12.6 in 2019. Patient received blood transfusion and currently her hemoglobin is 7.4.   Also she was noted to have elevated troponin with cardiology evaluation pending at this time    Information sources:   -Patient  -medical record  -health care team    PMHx:  Past Medical History:   Diagnosis Date    Alzheimer disease ([facility] [address]     Hyperlipidemia     Hypertension        PSHx:  Past Surgical History:   Procedure Laterality Date    COLONOSCOPY      ENDOSCOPY, COLON, DIAGNOSTIC      HERNIA REPAIR Left 12/1/2015    femoral    HYSTERECTOMY (CERVIX STATUS UNKNOWN)      JOINT REPLACEMENT Bilateral        Meds:  Current Facility-Administered Medications   Medication Dose Route Frequency Provider Last Rate Last Admin spironolactone (ALDACTONE) tablet 25 mg  25 mg Oral QAM Ravindra Dries, DO   25 mg at 09/22/22 1047    ferric gluconate (FERRLECIT) 125 mg in sodium chloride 0.9 % 100 mL IVPB  125 mg IntraVENous Daily Terisa [place] Bisel,  mL/hr at 09/22/22 1047 125 mg at 09/22/22 1047    pantoprazole (PROTONIX) injection 40 mg  40 mg IntraVENous BID Ravindra Dries, DO   40 mg at 09/22/22 1047    glucose chewable tablet 16 g  4 tablet Oral PRN Ravindra Dries, DO        dextrose bolus 10% 125 mL  125 mL IntraVENous PRN Ravindra Dries, DO        Or    dextrose bolus 10% 250 mL  250 mL IntraVENous PRN Arvindra Dries, DO        glucagon (rDNA) injection 1 mg  1 mg SubCUTAneous PRN Ravindra Dries, DO        dextrose 10 % infusion   IntraVENous Continuous PRN Ravindra Dries, DO        sodium chloride flush 0.9 % injection 5-40 mL  5-40 mL IntraVENous 2 times per day Ravindra Dries, DO   10 mL at 09/22/22 1047    sodium chloride flush 0.9 % injection 5-40 mL  5-40 mL IntraVENous PRN Ravindra Dries, DO        0.9 % sodium chloride infusion   IntraVENous PRN Ravindra Dries, DO        polyethylene glycol (GLYCOLAX) packet 17 g  17 g Oral Daily PRN Ravindra Dries, DO        acetaminophen (TYLENOL) tablet 650 mg  650 mg Oral Q6H PRN Ravindra Dries, DO   650 mg at 09/21/22 1825    Or    acetaminophen (TYLENOL) suppository 650 mg  650 mg Rectal Q6H PRN Ravindra Dries, DO        busPIRone (BUSPAR) tablet 10 mg  10 mg Oral BID Ravindra Dries, DO   10 mg at 09/22/22 1046    donepezil (ARICEPT) tablet 10 mg  10 mg Oral Nightly Ravindra Dries, DO   10 mg at 09/21/22 1949    DULoxetine (CYMBALTA) extended release capsule 30 mg  30 mg Oral QAM Isreinaldo U Dawson, DO   30 mg at 09/22/22 1046    gabapentin (NEURONTIN) capsule 100 mg  100 mg Oral TID Ravindra Dries, DO   100 mg at 09/22/22 1047    levothyroxine (SYNTHROID) tablet 75 mcg  75 mcg Oral QAM Isreinaldo Osman DO   75 mcg at 09/22/22 1046    melatonin disintegrating tablet 10 mg 10 mg Oral Nightly Jay Hillister, DO   10 mg at 22 1949    memantine (NAMENDA) tablet 10 mg  10 mg Oral BID Jay Hillister, DO   10 mg at 22 1047    haloperidol lactate (HALDOL) injection 5 mg  5 mg IntraMUSCular Nightly PRN Jay Hillister, DO        ipratropium-albuterol (DUONEB) nebulizer solution 1 ampule  1 ampule Inhalation Q4H PRN Jay Hillister, DO        QUEtiapine (SEROQUEL) tablet 12.5 mg  12.5 mg Oral Nightly Ismail U Dawson, DO   12.5 mg at 22 2220    0.9 % sodium chloride infusion   IntraVENous PRN Jay Hillister, DO            SocHx:  Social History     Socioeconomic History    Marital status:      Spouse name: Not on file    Number of children: Not on file    Years of education: Not on file    Highest education level: Not on file   Occupational History    Not on file   Tobacco Use    Smoking status: Former     Packs/day: 0.50     Years: 31.00     Pack years: 15.50     Types: Cigarettes     Start date: 1961     Quit date: 8/3/1992     Years since quittin.1    Smokeless tobacco: Never   Vaping Use    Vaping Use: Never used   Substance and Sexual Activity    Alcohol use: Not Currently     Alcohol/week: 0.0 standard drinks    Drug use: No    Sexual activity: Not on file   Other Topics Concern    Not on file   Social History Narrative    Not on file     Social Determinants of Health     Financial Resource Strain: Not on file   Food Insecurity: Not on file   Transportation Needs: Not on file   Physical Activity: Not on file   Stress: Not on file   Social Connections: Not on file   Intimate Partner Violence: Not on file   Housing Stability: Not on file       FamHx:History reviewed. No pertinent family history. Allergy:No Known Allergies      ROS: As described in the HPI and in addition is negative upon detailed review of systems or unobtainable unless otherwise stated in this dictation.     PE:  /62   Pulse 87   Temp 98.2 °F (36.8 °C) (Oral)   Resp 17   Ht 5' 8\" (1.727 m)   Wt 180 lb (81.6 kg)   LMP  (LMP Unknown)   SpO2 94%   BMI 27.37 kg/m²     Gen.: NAD/ female  Head: Atraumatic/normocephalic  Eyes: EOMI/anicteric sclera  ENT: Moist oral mucosa/no discharge from nose ears  Neck: Supple with trachea midline  Chest: Symmetric excursion/labored respiration/CTA B  Cor: Regular/S1-S2  Abd.: Soft/obese and nontender  Extr.:  Bilateral 1-2+ edema lower extremities  Muscles: Decreased tone and bulk, consistent with age and condition  Skin: Warm and dry/anicteric      DATA:     Lab Results   Component Value Date/Time    WBC 10.4 09/22/2022 06:21 AM    RBC 3.78 09/22/2022 06:21 AM    HGB 7.4 09/22/2022 06:21 AM    HCT 25.6 09/22/2022 06:21 AM    MCV 67.7 09/22/2022 06:21 AM    MCH 19.6 09/22/2022 06:21 AM    MCHC 28.9 09/22/2022 06:21 AM    RDW 23.0 09/22/2022 06:21 AM     09/22/2022 06:21 AM    MPV 9.7 09/22/2022 06:21 AM     Lab Results   Component Value Date/Time     09/22/2022 06:21 AM    K 3.4 09/22/2022 06:21 AM    K 4.1 09/21/2022 09:55 AM     09/22/2022 06:21 AM    CO2 24 09/22/2022 06:21 AM    BUN 17 09/22/2022 06:21 AM    CREATININE 0.7 09/22/2022 06:21 AM    CALCIUM 8.8 09/22/2022 06:21 AM    PROT 6.1 09/22/2022 06:21 AM    LABALBU 3.4 09/22/2022 06:21 AM    BILITOT 1.6 09/22/2022 06:21 AM    ALKPHOS 127 09/22/2022 06:21 AM    AST 26 09/22/2022 06:21 AM    ALT 14 09/22/2022 06:21 AM     No results found for: LIPASE  No results found for: AMYLASE      ASSESSMENT/PLAN:  Patient Active Problem List   Diagnosis    Memory change    Hyperlipidemia    Hypertension    Neuropathy    Hypothyroid    H/O total hysterectomy    Calculus of gallbladder without cholecystitis without obstruction    Left inguinal hernia    Late onset Alzheimer's disease without behavioral disturbance ([facility] [address]    Acute blood loss anemia     1.   Anemia  -Acute on chronic  -No evidence of overt bleed  -Significant iron deficient  -Patient will require further evaluation with EGD and colonoscopy when cleared from cardiac standpoint  -Tentatively plan for an EGD tomorrow with recommendations regarding colonoscopy to follow  -Twice a day PPI    2. Elevated troponin  -Likely demand ischemia  -Await cardiology input    Thank you for the opportunity to see this patient in consultation    Palmer Alexander MD  9/22/2022  12:05 PM    NOTE:  This report was transcribed using voice recognition software. Every effort was made to ensure accuracy; however, inadvertent computerized transcription errors may be present.

## 2022-09-23 ENCOUNTER — ANESTHESIA (OUTPATIENT)
Dept: ENDOSCOPY | Age: 82
DRG: 811 | End: 2022-09-23
Payer: COMMERCIAL

## 2022-09-23 LAB
ALBUMIN SERPL-MCNC: 3.3 G/DL (ref 3.5–5.2)
ALP BLD-CCNC: 137 U/L (ref 35–104)
ALT SERPL-CCNC: 15 U/L (ref 0–32)
ANION GAP SERPL CALCULATED.3IONS-SCNC: 11 MMOL/L (ref 7–16)
ANISOCYTOSIS: ABNORMAL
AST SERPL-CCNC: 25 U/L (ref 0–31)
BASOPHILS ABSOLUTE: 0.11 E9/L (ref 0–0.2)
BASOPHILS RELATIVE PERCENT: 1 % (ref 0–2)
BILIRUB SERPL-MCNC: 0.9 MG/DL (ref 0–1.2)
BUN BLDV-MCNC: 10 MG/DL (ref 6–23)
BURR CELLS: ABNORMAL
CALCIUM SERPL-MCNC: 9.2 MG/DL (ref 8.6–10.2)
CHLORIDE BLD-SCNC: 102 MMOL/L (ref 98–107)
CO2: 25 MMOL/L (ref 22–29)
CREAT SERPL-MCNC: 0.7 MG/DL (ref 0.5–1)
EOSINOPHILS ABSOLUTE: 0.68 E9/L (ref 0.05–0.5)
EOSINOPHILS RELATIVE PERCENT: 6.3 % (ref 0–6)
GFR AFRICAN AMERICAN: >60
GFR NON-AFRICAN AMERICAN: >60 ML/MIN/1.73
GLUCOSE BLD-MCNC: 100 MG/DL (ref 74–99)
HCT VFR BLD CALC: 28.4 % (ref 34–48)
HCT VFR BLD CALC: 28.7 % (ref 34–48)
HCT VFR BLD CALC: 29 % (ref 34–48)
HCT VFR BLD CALC: 29 % (ref 34–48)
HEMOGLOBIN: 8.1 G/DL (ref 11.5–15.5)
HEMOGLOBIN: 8.2 G/DL (ref 11.5–15.5)
HEMOGLOBIN: 8.2 G/DL (ref 11.5–15.5)
HEMOGLOBIN: 8.5 G/DL (ref 11.5–15.5)
HYPOCHROMIA: ABNORMAL
IMMATURE GRANULOCYTES #: 0.06 E9/L
IMMATURE GRANULOCYTES %: 0.6 % (ref 0–5)
LYMPHOCYTES ABSOLUTE: 1.66 E9/L (ref 1.5–4)
LYMPHOCYTES RELATIVE PERCENT: 15.5 % (ref 20–42)
MCH RBC QN AUTO: 19.6 PG (ref 26–35)
MCHC RBC AUTO-ENTMCNC: 28.2 % (ref 32–34.5)
MCV RBC AUTO: 69.3 FL (ref 80–99.9)
MONOCYTES ABSOLUTE: 1.03 E9/L (ref 0.1–0.95)
MONOCYTES RELATIVE PERCENT: 9.6 % (ref 2–12)
NEUTROPHILS ABSOLUTE: 7.17 E9/L (ref 1.8–7.3)
NEUTROPHILS RELATIVE PERCENT: 67 % (ref 43–80)
OVALOCYTES: ABNORMAL
PDW BLD-RTO: 23.9 FL (ref 11.5–15)
PLATELET # BLD: 340 E9/L (ref 130–450)
PMV BLD AUTO: 9.8 FL (ref 7–12)
POIKILOCYTES: ABNORMAL
POLYCHROMASIA: ABNORMAL
POTASSIUM SERPL-SCNC: 3.5 MMOL/L (ref 3.5–5)
PRO-BNP: 6055 PG/ML (ref 0–450)
RBC # BLD: 4.14 E12/L (ref 3.5–5.5)
SODIUM BLD-SCNC: 138 MMOL/L (ref 132–146)
TEAR DROP CELLS: ABNORMAL
TOTAL PROTEIN: 6.2 G/DL (ref 6.4–8.3)
TROPONIN, HIGH SENSITIVITY: 315 NG/L (ref 0–9)
WBC # BLD: 10.7 E9/L (ref 4.5–11.5)

## 2022-09-23 PROCEDURE — 3609017100 HC EGD: Performed by: INTERNAL MEDICINE

## 2022-09-23 PROCEDURE — 2709999900 HC NON-CHARGEABLE SUPPLY: Performed by: INTERNAL MEDICINE

## 2022-09-23 PROCEDURE — 2580000003 HC RX 258

## 2022-09-23 PROCEDURE — 80053 COMPREHEN METABOLIC PANEL: CPT

## 2022-09-23 PROCEDURE — 84484 ASSAY OF TROPONIN QUANT: CPT

## 2022-09-23 PROCEDURE — 6370000000 HC RX 637 (ALT 250 FOR IP): Performed by: INTERNAL MEDICINE

## 2022-09-23 PROCEDURE — 2580000003 HC RX 258: Performed by: INTERNAL MEDICINE

## 2022-09-23 PROCEDURE — 6360000002 HC RX W HCPCS: Performed by: INTERNAL MEDICINE

## 2022-09-23 PROCEDURE — 85018 HEMOGLOBIN: CPT

## 2022-09-23 PROCEDURE — 36415 COLL VENOUS BLD VENIPUNCTURE: CPT

## 2022-09-23 PROCEDURE — 3700000001 HC ADD 15 MINUTES (ANESTHESIA): Performed by: INTERNAL MEDICINE

## 2022-09-23 PROCEDURE — 85025 COMPLETE CBC W/AUTO DIFF WBC: CPT

## 2022-09-23 PROCEDURE — 3700000000 HC ANESTHESIA ATTENDED CARE: Performed by: INTERNAL MEDICINE

## 2022-09-23 PROCEDURE — 7100000010 HC PHASE II RECOVERY - FIRST 15 MIN: Performed by: INTERNAL MEDICINE

## 2022-09-23 PROCEDURE — 83880 ASSAY OF NATRIURETIC PEPTIDE: CPT

## 2022-09-23 PROCEDURE — 0DJ08ZZ INSPECTION OF UPPER INTESTINAL TRACT, VIA NATURAL OR ARTIFICIAL OPENING ENDOSCOPIC: ICD-10-PCS | Performed by: INTERNAL MEDICINE

## 2022-09-23 PROCEDURE — C9113 INJ PANTOPRAZOLE SODIUM, VIA: HCPCS | Performed by: INTERNAL MEDICINE

## 2022-09-23 PROCEDURE — 85014 HEMATOCRIT: CPT

## 2022-09-23 PROCEDURE — 6360000002 HC RX W HCPCS

## 2022-09-23 PROCEDURE — 1200000000 HC SEMI PRIVATE

## 2022-09-23 PROCEDURE — 93005 ELECTROCARDIOGRAM TRACING: CPT | Performed by: INTERNAL MEDICINE

## 2022-09-23 PROCEDURE — 7100000011 HC PHASE II RECOVERY - ADDTL 15 MIN: Performed by: INTERNAL MEDICINE

## 2022-09-23 PROCEDURE — 99233 SBSQ HOSP IP/OBS HIGH 50: CPT | Performed by: INTERNAL MEDICINE

## 2022-09-23 RX ORDER — METOPROLOL SUCCINATE 25 MG/1
12.5 TABLET, EXTENDED RELEASE ORAL DAILY
Status: DISCONTINUED | OUTPATIENT
Start: 2022-09-23 | End: 2022-09-26

## 2022-09-23 RX ORDER — PROPOFOL 10 MG/ML
INJECTION, EMULSION INTRAVENOUS PRN
Status: DISCONTINUED | OUTPATIENT
Start: 2022-09-23 | End: 2022-09-23 | Stop reason: SDUPTHER

## 2022-09-23 RX ORDER — FUROSEMIDE 10 MG/ML
20 INJECTION INTRAMUSCULAR; INTRAVENOUS 2 TIMES DAILY
Status: DISCONTINUED | OUTPATIENT
Start: 2022-09-23 | End: 2022-09-24

## 2022-09-23 RX ORDER — SODIUM CHLORIDE 9 MG/ML
INJECTION, SOLUTION INTRAVENOUS CONTINUOUS PRN
Status: DISCONTINUED | OUTPATIENT
Start: 2022-09-23 | End: 2022-09-23 | Stop reason: SDUPTHER

## 2022-09-23 RX ADMIN — PANTOPRAZOLE SODIUM 40 MG: 40 INJECTION, POWDER, FOR SOLUTION INTRAVENOUS at 20:22

## 2022-09-23 RX ADMIN — FUROSEMIDE 20 MG: 10 INJECTION, SOLUTION INTRAMUSCULAR; INTRAVENOUS at 13:27

## 2022-09-23 RX ADMIN — PYRIDOXINE HCL TAB 50 MG 50 MG: 50 TAB at 08:53

## 2022-09-23 RX ADMIN — SODIUM CHLORIDE: 900 INJECTION, SOLUTION INTRAVENOUS at 15:44

## 2022-09-23 RX ADMIN — Medication 10 ML: at 20:22

## 2022-09-23 RX ADMIN — Medication 10 ML: at 09:13

## 2022-09-23 RX ADMIN — MEMANTINE HYDROCHLORIDE 10 MG: 10 TABLET ORAL at 20:21

## 2022-09-23 RX ADMIN — BUSPIRONE HYDROCHLORIDE 10 MG: 10 TABLET ORAL at 20:22

## 2022-09-23 RX ADMIN — FUROSEMIDE 20 MG: 10 INJECTION, SOLUTION INTRAMUSCULAR; INTRAVENOUS at 18:13

## 2022-09-23 RX ADMIN — MEMANTINE HYDROCHLORIDE 10 MG: 10 TABLET ORAL at 08:53

## 2022-09-23 RX ADMIN — QUETIAPINE FUMARATE 12.5 MG: 25 TABLET ORAL at 20:21

## 2022-09-23 RX ADMIN — GABAPENTIN 100 MG: 100 CAPSULE ORAL at 08:53

## 2022-09-23 RX ADMIN — BUSPIRONE HYDROCHLORIDE 10 MG: 10 TABLET ORAL at 08:53

## 2022-09-23 RX ADMIN — METOPROLOL SUCCINATE 12.5 MG: 25 TABLET, EXTENDED RELEASE ORAL at 14:27

## 2022-09-23 RX ADMIN — DONEPEZIL HYDROCHLORIDE 10 MG: 5 TABLET, FILM COATED ORAL at 20:22

## 2022-09-23 RX ADMIN — GABAPENTIN 100 MG: 100 CAPSULE ORAL at 14:27

## 2022-09-23 RX ADMIN — SPIRONOLACTONE 25 MG: 25 TABLET ORAL at 08:53

## 2022-09-23 RX ADMIN — SODIUM CHLORIDE 125 MG: 9 INJECTION, SOLUTION INTRAVENOUS at 09:18

## 2022-09-23 RX ADMIN — GABAPENTIN 100 MG: 100 CAPSULE ORAL at 20:22

## 2022-09-23 RX ADMIN — LEVOTHYROXINE SODIUM 75 MCG: 0.07 TABLET ORAL at 08:53

## 2022-09-23 RX ADMIN — DULOXETINE HYDROCHLORIDE 30 MG: 30 CAPSULE, DELAYED RELEASE ORAL at 08:53

## 2022-09-23 RX ADMIN — PROPOFOL 80 MG: 10 INJECTION, EMULSION INTRAVENOUS at 16:00

## 2022-09-23 RX ADMIN — PANTOPRAZOLE SODIUM 40 MG: 40 INJECTION, POWDER, FOR SOLUTION INTRAVENOUS at 09:13

## 2022-09-23 RX ADMIN — Medication 10 MG: at 20:22

## 2022-09-23 NOTE — PROGRESS NOTES
Date:2022  Patient Name: Tiffanie Slater  MRN: [id number]  : 1940  ROOM #: 0421/0421-01    Occupational Therapy order received, chart reviewed and evaluation attempted this PM. Patient is unavailable for OT evaluation due to off floor. Will re-attempt OT evaluation at a later time. Thank you.      Remi Pickard OTR/L #[id number]

## 2022-09-23 NOTE — H&P
Patient seen and examined. Pertinent notes/labs reviewed. H&P reviewed -no new changes except as described above. Vitals:    09/23/22 0758   BP: 104/74   Pulse: (!) 102   Resp: 15   Temp: 99 °F (37.2 °C)   SpO2: 90%     Plan to proceed with upper endoscopy later today.   Recommendations regarding colonoscopy to follow    Ap Hope MD

## 2022-09-23 NOTE — ANESTHESIA POSTPROCEDURE EVALUATION
Department of Anesthesiology  Postprocedure Note    Patient: Manny Nyhan  MRN: [id number]  YOB: 1940  Date of evaluation: 9/23/2022      Procedure Summary     Date: 09/23/22 Room / Location: [address] [place] [address] / [facility] AT [facility] ([facility])    Anesthesia Start: 2338 Anesthesia Stop: 7437    Procedure: EGD ESOPHAGOGASTRODUODENOSCOPY Diagnosis:       Anemia, unspecified type      (Anemia, unspecified type [D64.9])    Surgeons: Edwardo Mendoza DO Responsible Provider: Erica Aguilera MD    Anesthesia Type: MAC ASA Status: 3          Anesthesia Type: No value filed.     Capri Phase I:      Capri Phase II: Capri Score: 10      Anesthesia Post Evaluation    Patient location during evaluation: PACU  Patient participation: complete - patient participated  Level of consciousness: awake  Airway patency: patent  Nausea & Vomiting: no nausea and no vomiting  Complications: no  Cardiovascular status: hemodynamically stable  Respiratory status: acceptable  Hydration status: euvolemic

## 2022-09-23 NOTE — PROCEDURES
Rafa Atkins is a [de-identified] y.o. female patient. 1. Acute blood loss anemia      Past Medical History:   Diagnosis Date    Alzheimer disease ([address]     Hyperlipidemia     Hypertension      Blood pressure 104/74, pulse (!) 102, temperature 99 °F (37.2 °C), temperature source Oral, resp. rate 15, height 5' 8\" (1.727 m), weight 180 lb (81.6 kg), SpO2 90 %, not currently breastfeeding. Procedures  Procedure:  Esophagogastroduodenoscopy    Indication: Acute on Chronic Anemia    Sedation:  MAC    EBL: N/A       Endoscope was advanced easily through mouth to second portion of duodenum      Oropharynx views are limited but grossly normal.    Esophagus:   Mucosa is normal.  GEJ at 40 cm. Stomach:   Antrum is normal    Gastric body is normal.    Retroflexed views show normal fundus and cardia. Duodenum: Bulb is normal.    Second portion of duodenum is normal.    IMPRESSION AND PLAN:     1. Normal upper endoscopy. No signs of inflammation, ulcerations, masses, fresh or old blood. 2. Ok to resume regular diet. Serial H/H Q8 Hrs, Transfuse PRN. Consider NM Bleeding scan vs colonoscopy pending patient's clinical course. Follow up as outpatient in office, call [phone] to schedule for appointment.       Denis Forbes,   9/23/2022

## 2022-09-23 NOTE — PROGRESS NOTES
Internal Medicine Progress Note    [facility]    Clotilde Salinas. Cooper Souza., PETE.ROSE MARIEOLITTLE Zayas D.O., CHARLEEN Larry D.O. Alois Lick, MSN, APRN, NP-C  [facility]. Adore Quinn, MSN, APRN-CNP       Primary Care Physician: Bard Mariama DO   Admitting Physician:  Bettina Mcdonald DO  Admission date and time: 9/21/2022  9:18 AM    Room:  [address] [place] [address]  Admitting diagnosis: Acute blood loss anemia [D62]    Patient Name: Sav Degroot  MRN: [id number]    Date of Service: 9/23/2022     Subjective:  Beryl Figueroa is a [de-identified] y.o. female who was seen and examined today,9/23/2022, at the bedside. Beryl Figueroa is pleasantly confused during my examination this morning but describes no significant issues overnight. She has been updated regarding 2D echocardiographic findings. The GI team is also following. She denies melanotic stool or bright red rectal bleeding. As stated previously, she has no new complaints. No family members were present during my examination. Review of System:   Constitutional:   Admits to generalized malaise and fatigue. HEENT:   Denies ear pain, sore throat, sinus or eye problems. Cardiovascular:   Denies any chest pain, irregular heartbeats, or palpitations. Respiratory:   Denies shortness of breath, coughing, sputum production, hemoptysis, or wheezing. Gastrointestinal:   Denies nausea, vomiting, diarrhea, or constipation. Denies any abdominal pain. Denies rectal bleeding or melanotic stool. Genitourinary:    Denies any urgency, frequency, hematuria. Voiding without difficulty. Extremities:   Denies lower extremity swelling, edema or cyanosis. Neurology:    Denies any headache or focal neurological deficits, admits to generalized weakness and deconditioning. Psch:   Denies being anxious or depressed.   Musculoskeletal:    Denies  myalgias, joint complaints or back pain.   Integumentary:   Denies any rashes, ulcers, or excoriations. Denies bruising. Hematologic/Lymphatic:  Denies bruising or bleeding. Physical Exam:  No intake/output data recorded. Intake/Output Summary (Last 24 hours) at 9/23/2022 0936  Last data filed at 9/23/2022 0604  Gross per 24 hour   Intake --   Output 4600 ml   Net -4600 ml   I/O last 3 completed shifts: In: 388.3 [Blood:388.3]  Out: 8177 [[facility]  Patient Vitals for the past 96 hrs (Last 3 readings):   Weight   09/21/22 1543 180 lb (81.6 kg)   09/21/22 0944 180 lb 8 oz (81.9 kg)     Vital Signs:   Blood pressure 104/74, pulse (!) 102, temperature 99 °F (37.2 °C), temperature source Oral, resp. rate 15, height 5' 8\" (1.727 m), weight 180 lb (81.6 kg), SpO2 90 %, not currently breastfeeding. General appearance:  Alert, responsive, oriented to person, place, and time. Well preserved, alert, no distress. Pleasantly confused in the setting of dementia. Head:  Normocephalic. No masses, lesions or tenderness. Eyes:  PERRLA. EOMI. Sclera clear. Buccal mucosa moist.  ENT:  Ears normal. Mucosa normal.  Neck:    Supple. Trachea midline. No thyromegaly. No JVD. No bruits. Heart:    Rhythm regular. Rate controlled. 1/6 systolic ejection murmur murmurs. Lungs:    Symmetrical. Clear to auscultation bilaterally. No wheezes. No rhonchi. No rales. Abdomen:   Soft. Non-tender. Non-distended. Bowel sounds positive. No organomegaly or masses. No pain on palpation. Extremities:    Peripheral pulses present. No peripheral edema. No ulcers. No cyanosis. No clubbing. Neurologic:    Alert x 3. No focal deficit. Cranial nerves grossly intact. No focal weakness. Psych:   Behavior is normal. Mood appears normal. Speech is not rapid and/or pressured. As stated previously, pleasantly confused with dementia. Musculoskeletal:   Spine ROM normal. Muscular strength intact. Gait not assessed.   Integumentary:  No rashes  Skin normal color and texture. Genitalia/Breast:  Deferred    Medication:  Scheduled Meds:   spironolactone  25 mg Oral QAM    ferric gluconate (FERRLECIT) IVPB  125 mg IntraVENous Daily    vitamin B-6  50 mg Oral Daily    pantoprazole  40 mg IntraVENous BID    sodium chloride flush  5-40 mL IntraVENous 2 times per day    busPIRone  10 mg Oral BID    donepezil  10 mg Oral Nightly    DULoxetine  30 mg Oral QAM    gabapentin  100 mg Oral TID    levothyroxine  75 mcg Oral QAM    melatonin  10 mg Oral Nightly    memantine  10 mg Oral BID    QUEtiapine  12.5 mg Oral Nightly     Continuous Infusions:   dextrose      sodium chloride      sodium chloride         Objective Data:  Recent Labs     09/21/22  0955 09/21/22  1933 09/22/22  0621 09/22/22  1545 09/23/22  0126 09/23/22  0527   WBC 10.2  --  10.4  --   --  10.7   RBC 3.07*  --  3.78  --   --  4.14   HGB 5.0*   < > 7.4* 7.9* 8.2* 8.1*   HCT 19.4*   < > 25.6* 27.4* 28.4* 28.7*   MCV 63.2*  --  67.7*  --   --  69.3*   MCH 16.3*  --  19.6*  --   --  19.6*   MCHC 25.8*  --  28.9*  --   --  28.2*   RDW 19.3*  --  23.0*  --   --  23.9*     --  297  --   --  340   MPV 10.0  --  9.7  --   --  9.8    < > = values in this interval not displayed.      Lab Results   Component Value Date/Time     09/23/2022 05:27 AM    K 3.5 09/23/2022 05:27 AM    K 4.1 09/21/2022 09:55 AM     09/23/2022 05:27 AM    CO2 25 09/23/2022 05:27 AM    ANIONGAP 11 09/23/2022 05:27 AM    GLUCOSE 100 09/23/2022 05:27 AM    BUN 10 09/23/2022 05:27 AM    CREATININE 0.7 09/23/2022 05:27 AM    GFRAA >60 09/23/2022 05:27 AM    LABGLOM >60 09/23/2022 05:27 AM    CALCIUM 9.2 09/23/2022 05:27 AM    BILITOT 0.9 09/23/2022 05:27 AM    ALT 15 09/23/2022 05:27 AM    AST 25 09/23/2022 05:27 AM    ALKPHOS 137 09/23/2022 05:27 AM    PROT 6.2 09/23/2022 05:27 AM    LABALBU 3.3 09/23/2022 05:27 AM     Recent Labs     09/22/22  0621   MG 2.2      Lab Results   Component Value Date/Time    PHOS 4.0 09/22/2022 06:21 AM     No results for input(s): TROPONINI in the last 72 hours. Assessment:  Multifactorial microcytic anemia with iron deficiency and suspicion for occult GI bleeding  Severe and newly identified cardiomyopathy with echocardiographic findings of regional wall motion abnormalities  Hypothyroidism on replacement therapy  Essential hypertension  Hyperlipidemia  Underlying dementia on Aricept and Namenda  History of tobacco abuse    Plan:   Hemoglobin seems to have stabilized at this point with no outward signs of bleeding. Iron replacement protocol is being employed. Unfortunately, 2D echocardiogram is indicating significant cardiomyopathy with no previous echocardiogram for comparison. Her blood pressure is labile and she will not tolerate up titration of goal-directed therapy at this time. Regional wall motion abnormalities were also identified. I will discuss this personally with the cardiovascular team as ischemic evaluation is likely necessary. GI team is also following for endoscopic intervention. Chronic comorbidities are being monitored. Her condition is largely impacted by her dementia and limited insight into her current situation. No family members were present during my examination but I will speak with them when they present to the bedside. She resides in an assisted living facility and may ultimately require temporary skilled nursing facility placement upon discharge. Pending clinical course, we will make this decision. More than 50% of my time was spent at the bedside counseling/coordinating care with the patient and/or family with face to face contact. This time was spent reviewing notes and laboratory data as well as instructing and counseling the patient. Time I spent with the family or surrogate(s) is included only if the patient was incapable of providing the necessary information or participating in medical decisions.  I also discussed the differential diagnosis and all of the proposed management plans with the patient and individuals accompanying the patient. I am readily available for any further decision-making and intervention.        Polina Daley DO, F.A.C.O.I.  9/23/2022  9:36 AM

## 2022-09-23 NOTE — PROGRESS NOTES
INPATIENT CARDIOLOGY FOLLOW-UP    Name: Giovana Kingston    Age: [de-identified] y.o. Date of Admission: 9/21/2022  9:18 AM    Date of Service: 9/23/2022    Primary Cardiologist: New to me from this admission    Chief Complaint: Follow-up for new onset decompensated heart failure, acute blood loss anemia    Interim History:  No new overnight cardiac complaints. Currently with no complaints of CP, SOB, palpitations, dizziness, or lightheadedness. SR on telemetry. Occasional PVCs seen. 7.8 L negative since yesterday. Hemoglobin at 8.1 today.     Review of Systems:   Negative except as described above    Problem List:  Patient Active Problem List   Diagnosis    Memory change    Hyperlipidemia    Hypertension    Neuropathy    Hypothyroid    H/O total hysterectomy    Calculus of gallbladder without cholecystitis without obstruction    Left inguinal hernia    Late onset Alzheimer's disease without behavioral disturbance (HCC)    Acute blood loss anemia    Acute decompensated heart failure (HCC)    Elevated troponin    Acute hypoxemic respiratory failure (HCC)       Current Medications:    Current Facility-Administered Medications:     furosemide (LASIX) injection 20 mg, 20 mg, IntraVENous, BID, Tim Alamo MD    spironolactone (ALDACTONE) tablet 25 mg, 25 mg, Oral, QAM, Ismail U Dawson, DO, 25 mg at 09/23/22 1167    vitamin B-6 (PYRIDOXINE) tablet 50 mg, 50 mg, Oral, Daily, Ricardo Brenner, DO, 50 mg at 09/23/22 0853    pantoprazole (PROTONIX) injection 40 mg, 40 mg, IntraVENous, BID, Ismail U Dawson, DO, 40 mg at 09/23/22 0913    glucose chewable tablet 16 g, 4 tablet, Oral, PRN, Ismail U Dawson, DO    dextrose bolus 10% 125 mL, 125 mL, IntraVENous, PRN **OR** dextrose bolus 10% 250 mL, 250 mL, IntraVENous, PRN, Ismail U Dawson, DO    glucagon (rDNA) injection 1 mg, 1 mg, SubCUTAneous, PRN, Ismail U Dawson, DO    dextrose 10 % infusion, , IntraVENous, Continuous PRN, Ismail U Dawson, DO    sodium chloride flush 0.9 % injection 5-40 mL, 5-40 mL, IntraVENous, 2 times per day, Loralie Caraballo, DO, 10 mL at 09/23/22 0913    sodium chloride flush 0.9 % injection 5-40 mL, 5-40 mL, IntraVENous, PRN, Ismail U Dawson, DO    0.9 % sodium chloride infusion, , IntraVENous, PRN, Ismail U Dawson, DO    polyethylene glycol (GLYCOLAX) packet 17 g, 17 g, Oral, Daily PRN, Loralie Caraballo, DO    acetaminophen (TYLENOL) tablet 650 mg, 650 mg, Oral, Q6H PRN, 650 mg at 09/21/22 1825 **OR** acetaminophen (TYLENOL) suppository 650 mg, 650 mg, Rectal, Q6H PRN, Loragathae Caraballo, DO    busPIRone (BUSPAR) tablet 10 mg, 10 mg, Oral, BID, Ismail U Dawson, DO, 10 mg at 09/23/22 0853    donepezil (ARICEPT) tablet 10 mg, 10 mg, Oral, Nightly, Ismail U Dawson, DO, 10 mg at 09/22/22 2207    DULoxetine (CYMBALTA) extended release capsule 30 mg, 30 mg, Oral, QAM, Ismail U Dawson, DO, 30 mg at 09/23/22 0853    gabapentin (NEURONTIN) capsule 100 mg, 100 mg, Oral, TID, Loralie Caraballo, DO, 100 mg at 09/23/22 1684    levothyroxine (SYNTHROID) tablet 75 mcg, 75 mcg, Oral, QAM, Loralie Caraballo, DO, 75 mcg at 09/23/22 3797    melatonin disintegrating tablet 10 mg, 10 mg, Oral, Nightly, Ismail U Dawson, DO, 10 mg at 09/22/22 2207    memantine (NAMENDA) tablet 10 mg, 10 mg, Oral, BID, Ismail U Dawson, DO, 10 mg at 09/23/22 8716    haloperidol lactate (HALDOL) injection 5 mg, 5 mg, IntraMUSCular, Nightly PRN, Loralie Caraballo, DO    ipratropium-albuterol (DUONEB) nebulizer solution 1 ampule, 1 ampule, Inhalation, Q4H PRN, Drew Caraballo DO    QUEtiapine (SEROQUEL) tablet 12.5 mg, 12.5 mg, Oral, Nightly, Ismail U DO Dawson, 12.5 mg at 09/22/22 2206    0.9 % sodium chloride infusion, , IntraVENous, PRN, Drew Caraballo DO    Physical Exam:  /74   Pulse (!) 102   Temp 99 °F (37.2 °C) (Oral)   Resp 15   Ht 5' 8\" (1.727 m)   Wt 180 lb (81.6 kg)   LMP  (LMP Unknown)   SpO2 90%   BMI 27.37 kg/m²   Wt Readings from Last 3 Encounters:   09/21/22 180 lb (81.6 kg)   01/08/20 123 lb (55.8 kg)   11/18/19 130 lb (59 kg)     Appearance: Awake, alert, no acute respiratory distress  Skin: Intact, no rash  Head: Normocephalic, atraumatic  Eyes: EOMI, no conjunctival erythema  ENMT: No pharyngeal erythema, MMM, no rhinorrhea  Neck: Supple, JVP elevated at 12 cm, no carotid bruits  Lungs: Clear to auscultation bilaterally. No wheezes, rales, or rhonchi. Cardiac: PMI nondisplaced, Regular rhythm with a normal rate, S1 & S2 normal, no murmurs  Abdomen: Soft, nontender, +bowel sounds  Extremities: Moves all extremities x 4, no lower extremity edema  Neurologic: No focal motor deficits apparent, normal mood and affect  Peripheral Pulses: Intact posterior tibial pulses bilaterally    Intake/Output:    Intake/Output Summary (Last 24 hours) at 9/23/2022 1316  Last data filed at 9/23/2022 1020  Gross per 24 hour   Intake --   Output 2700 ml   Net -2700 ml     I/O this shift:  In: -   Out: 400 [Urine:400]    Laboratory Tests:  Recent Labs     09/21/22  0955 09/22/22  0621 09/23/22  0527    136 138   K 4.1 3.4* 3.5    103 102   CO2 21* 24 25   BUN 25* 17 10   CREATININE 0.9 0.7 0.7   GLUCOSE 94 95 100*   CALCIUM 9.1 8.8 9.2     Lab Results   Component Value Date/Time    MG 2.2 09/22/2022 06:21 AM     Recent Labs     09/22/22  0621 09/23/22  0527   ALKPHOS 127* 137*   ALT 14 15   AST 26 25   PROT 6.1* 6.2*   BILITOT 1.6* 0.9   LABALBU 3.4* 3.3*     Recent Labs     09/21/22  0955 09/21/22  1933 09/22/22  0621 09/22/22  1545 09/23/22  0126 09/23/22  0527   WBC 10.2  --  10.4  --   --  10.7   RBC 3.07*  --  3.78  --   --  4.14   HGB 5.0*   < > 7.4* 7.9* 8.2* 8.1*   HCT 19.4*   < > 25.6* 27.4* 28.4* 28.7*   MCV 63.2*  --  67.7*  --   --  69.3*   MCH 16.3*  --  19.6*  --   --  19.6*   MCHC 25.8*  --  28.9*  --   --  28.2*   RDW 19.3*  --  23.0*  --   --  23.9*     --  297  --   --  340   MPV 10.0  --  9.7  --   --  9.8    < > = values in this interval not displayed.      Lab Results   Component Value Date    TROPONINI <0.01 07/02/2019     Lab Results   Component Value Date    INR 1.3 09/22/2022    INR 1.3 09/21/2022    PROTIME 14.6 (H) 09/22/2022    PROTIME 14.9 (H) 09/21/2022     Lab Results   Component Value Date    TSH 2.580 09/22/2022     Lab Results   Component Value Date    LABA1C 5.7 (H) 11/18/2019     No results found for: EAG  Lab Results   Component Value Date    CHOL 135 11/18/2019    CHOL 151 08/03/2018    CHOL 167 11/29/2017     Lab Results   Component Value Date    TRIG 159 (H) 11/18/2019    TRIG 122 08/03/2018    TRIG 152 (H) 11/29/2017     Lab Results   Component Value Date    HDL 67 11/18/2019    HDL 62 08/03/2018    HDL 59 11/29/2017     Lab Results   Component Value Date    LDLCALC 36 11/18/2019    LDLCALC 65 08/03/2018    LDLCALC 78 11/29/2017     Lab Results   Component Value Date    LABVLDL 32 11/18/2019    LABVLDL 24 08/03/2018    LABVLDL 30 11/29/2017     No results found for: [address]     09/21/22  1933 09/23/22  0527   PROBNP 8,795* 6,055*       Cardiac Tests:    Echocardiogram from 9/22/2022:   Top normal left ventricular chamber size. Moderate global hypokinesis. Visually estimated LVEF is 35-40%. Regional variations in wall motion seen   Grade II diastolic dysfunction with elevated LA pressure. Normal right ventricle structure and function. The left atrium is mildly dilated. Normal right atrial size   Mild mitral regurgitation is present. Unable to estimate PA pressure. Elevated Bi-atrial pressures. No comparison study available.     ASSESSMENT:  Elevated troponin  hS-troponin 135 --> 162 --> 258  No acute ST changes on EKG  Likely demand ischemia in setting of severe acute anemia, denies CP to me  Severe acute anemia, Hgb 5.0g s/p two units pRBC  Acute hypoxic respiratory failure, now on nasal cannula  HTN, controlled  HLD, on statin therapy  Hypoalbuminemia  Elevated bilirubin  Hypokalemia  Hypothyroidism, on HRT  Former tobacco

## 2022-09-24 LAB
ALBUMIN SERPL-MCNC: 3.1 G/DL (ref 3.5–5.2)
ALP BLD-CCNC: 139 U/L (ref 35–104)
ALT SERPL-CCNC: 13 U/L (ref 0–32)
ANION GAP SERPL CALCULATED.3IONS-SCNC: 12 MMOL/L (ref 7–16)
ANISOCYTOSIS: ABNORMAL
AST SERPL-CCNC: 21 U/L (ref 0–31)
BASOPHILS ABSOLUTE: 0.1 E9/L (ref 0–0.2)
BASOPHILS RELATIVE PERCENT: 0.9 % (ref 0–2)
BILIRUB SERPL-MCNC: 0.7 MG/DL (ref 0–1.2)
BUN BLDV-MCNC: 15 MG/DL (ref 6–23)
BURR CELLS: ABNORMAL
CALCIUM SERPL-MCNC: 9.2 MG/DL (ref 8.6–10.2)
CHLORIDE BLD-SCNC: 103 MMOL/L (ref 98–107)
CO2: 25 MMOL/L (ref 22–29)
CREAT SERPL-MCNC: 0.8 MG/DL (ref 0.5–1)
EOSINOPHILS ABSOLUTE: 0.38 E9/L (ref 0.05–0.5)
EOSINOPHILS RELATIVE PERCENT: 3.5 % (ref 0–6)
GFR AFRICAN AMERICAN: >60
GFR NON-AFRICAN AMERICAN: >60 ML/MIN/1.73
GLUCOSE BLD-MCNC: 88 MG/DL (ref 74–99)
HCT VFR BLD CALC: 28.9 % (ref 34–48)
HCT VFR BLD CALC: 29.6 % (ref 34–48)
HCT VFR BLD CALC: 30 % (ref 34–48)
HEMOGLOBIN: 8 G/DL (ref 11.5–15.5)
HEMOGLOBIN: 8.2 G/DL (ref 11.5–15.5)
HEMOGLOBIN: 8.4 G/DL (ref 11.5–15.5)
HYPOCHROMIA: ABNORMAL
LYMPHOCYTES ABSOLUTE: 1.31 E9/L (ref 1.5–4)
LYMPHOCYTES RELATIVE PERCENT: 12.2 % (ref 20–42)
MCH RBC QN AUTO: 19.9 PG (ref 26–35)
MCHC RBC AUTO-ENTMCNC: 27.7 % (ref 32–34.5)
MCV RBC AUTO: 71.7 FL (ref 80–99.9)
MONOCYTES ABSOLUTE: 0.54 E9/L (ref 0.1–0.95)
MONOCYTES RELATIVE PERCENT: 5.2 % (ref 2–12)
NEUTROPHILS ABSOLUTE: 8.5 E9/L (ref 1.8–7.3)
NEUTROPHILS RELATIVE PERCENT: 78.3 % (ref 43–80)
OVALOCYTES: ABNORMAL
PDW BLD-RTO: 25.6 FL (ref 11.5–15)
PLATELET # BLD: 341 E9/L (ref 130–450)
PMV BLD AUTO: 9.9 FL (ref 7–12)
POIKILOCYTES: ABNORMAL
POLYCHROMASIA: ABNORMAL
POTASSIUM SERPL-SCNC: 3.4 MMOL/L (ref 3.5–5)
RBC # BLD: 4.03 E12/L (ref 3.5–5.5)
SODIUM BLD-SCNC: 140 MMOL/L (ref 132–146)
TARGET CELLS: ABNORMAL
TEAR DROP CELLS: ABNORMAL
TOTAL PROTEIN: 6.1 G/DL (ref 6.4–8.3)
WBC # BLD: 10.9 E9/L (ref 4.5–11.5)

## 2022-09-24 PROCEDURE — 85025 COMPLETE CBC W/AUTO DIFF WBC: CPT

## 2022-09-24 PROCEDURE — 80053 COMPREHEN METABOLIC PANEL: CPT

## 2022-09-24 PROCEDURE — 6370000000 HC RX 637 (ALT 250 FOR IP): Performed by: INTERNAL MEDICINE

## 2022-09-24 PROCEDURE — 36415 COLL VENOUS BLD VENIPUNCTURE: CPT

## 2022-09-24 PROCEDURE — 1200000000 HC SEMI PRIVATE

## 2022-09-24 PROCEDURE — 2580000003 HC RX 258: Performed by: INTERNAL MEDICINE

## 2022-09-24 PROCEDURE — 85014 HEMATOCRIT: CPT

## 2022-09-24 PROCEDURE — 6360000002 HC RX W HCPCS: Performed by: INTERNAL MEDICINE

## 2022-09-24 PROCEDURE — 85018 HEMOGLOBIN: CPT

## 2022-09-24 PROCEDURE — 99233 SBSQ HOSP IP/OBS HIGH 50: CPT | Performed by: INTERNAL MEDICINE

## 2022-09-24 PROCEDURE — C9113 INJ PANTOPRAZOLE SODIUM, VIA: HCPCS | Performed by: INTERNAL MEDICINE

## 2022-09-24 RX ORDER — FUROSEMIDE 20 MG/1
20 TABLET ORAL 2 TIMES DAILY
Status: DISCONTINUED | OUTPATIENT
Start: 2022-09-24 | End: 2022-09-27 | Stop reason: HOSPADM

## 2022-09-24 RX ORDER — PANTOPRAZOLE SODIUM 40 MG/1
40 TABLET, DELAYED RELEASE ORAL
Status: DISCONTINUED | OUTPATIENT
Start: 2022-09-25 | End: 2022-09-27 | Stop reason: HOSPADM

## 2022-09-24 RX ADMIN — Medication 10 MG: at 20:51

## 2022-09-24 RX ADMIN — QUETIAPINE FUMARATE 12.5 MG: 25 TABLET ORAL at 20:52

## 2022-09-24 RX ADMIN — DONEPEZIL HYDROCHLORIDE 10 MG: 5 TABLET, FILM COATED ORAL at 20:51

## 2022-09-24 RX ADMIN — DULOXETINE HYDROCHLORIDE 30 MG: 30 CAPSULE, DELAYED RELEASE ORAL at 08:38

## 2022-09-24 RX ADMIN — Medication 10 ML: at 08:52

## 2022-09-24 RX ADMIN — BUSPIRONE HYDROCHLORIDE 10 MG: 10 TABLET ORAL at 08:39

## 2022-09-24 RX ADMIN — PYRIDOXINE HCL TAB 50 MG 50 MG: 50 TAB at 08:38

## 2022-09-24 RX ADMIN — FUROSEMIDE 20 MG: 20 TABLET ORAL at 08:48

## 2022-09-24 RX ADMIN — GABAPENTIN 100 MG: 100 CAPSULE ORAL at 20:53

## 2022-09-24 RX ADMIN — FUROSEMIDE 20 MG: 20 TABLET ORAL at 18:58

## 2022-09-24 RX ADMIN — MEMANTINE HYDROCHLORIDE 10 MG: 10 TABLET ORAL at 20:51

## 2022-09-24 RX ADMIN — MEMANTINE HYDROCHLORIDE 10 MG: 10 TABLET ORAL at 08:38

## 2022-09-24 RX ADMIN — BUSPIRONE HYDROCHLORIDE 10 MG: 10 TABLET ORAL at 20:52

## 2022-09-24 RX ADMIN — METOPROLOL SUCCINATE 12.5 MG: 25 TABLET, EXTENDED RELEASE ORAL at 08:38

## 2022-09-24 RX ADMIN — SPIRONOLACTONE 25 MG: 25 TABLET ORAL at 08:38

## 2022-09-24 RX ADMIN — GABAPENTIN 100 MG: 100 CAPSULE ORAL at 08:39

## 2022-09-24 RX ADMIN — LEVOTHYROXINE SODIUM 75 MCG: 0.07 TABLET ORAL at 08:39

## 2022-09-24 RX ADMIN — PANTOPRAZOLE SODIUM 40 MG: 40 INJECTION, POWDER, FOR SOLUTION INTRAVENOUS at 08:37

## 2022-09-24 RX ADMIN — Medication 10 ML: at 20:53

## 2022-09-24 RX ADMIN — GABAPENTIN 100 MG: 100 CAPSULE ORAL at 14:43

## 2022-09-24 NOTE — PROGRESS NOTES
[facility]        CARDIOLOGY                 INPATIENT PROGRESS NOTE          PATIENT SEEN IN FOLLOW UP FOR:  CHF    Hospital Day: 4     Luis A Delgadillo is a [de-identified]year old patient known to Dr. Jyoti Brennan from initial consult this admission      SUBJECTIVE: Denies any chest pain, breathing OK, No orthoipnea    ROS: Review of rest of 10 systems negative except as mentioned above    OBJECTIVE: No acute distress. See Assessment     Diagnostics:       Telemetry: Reviewed    Echocardiogram from 9/22/2022:   Top normal left ventricular chamber size. Moderate global hypokinesis. Visually estimated LVEF is 35-40%. Regional variations in wall motion seen   Grade II diastolic dysfunction with elevated LA pressure. Normal right ventricle structure and function. The left atrium is mildly dilated. Normal right atrial size   Mild mitral regurgitation is present. Unable to estimate PA pressure. Elevated Bi-atrial pressures. No comparison study available.:       Intake/Output Summary (Last 24 hours) at 9/24/2022 0948  Last data filed at 9/23/2022 1752  Gross per 24 hour   Intake 460 ml   Output 400 ml   Net 60 ml       Labs:   CBC:   Recent Labs     09/23/22  0527 09/23/22  1726 09/23/22  2159 09/24/22  0623   WBC 10.7  --   --  10.9   HGB 8.1*   < > 8.2* 8.0*   HCT 28.7*   < > 29.0* 28.9*     --   --  341    < > = values in this interval not displayed. BMP:   Recent Labs     09/23/22  0527 09/24/22  0623    140   K 3.5 3.4*   CO2 25 25   BUN 10 15   CREATININE 0.7 0.8   LABGLOM >60 >60   CALCIUM 9.2 9.2     Mag:   Recent Labs     09/22/22  0621   MG 2.2     Phos:   Recent Labs     09/22/22  0621   PHOS 4.0     TSH:   Recent Labs     09/22/22 0621   TSH 2.580     HgA1c:     BNP: No results for input(s): BNP in the last 72 hours. PT/INR:   Recent Labs     09/21/22  1933 09/22/22  0621   PROTIME 14.9* 14.6*   INR 1.3 1.3     APTT:No results for input(s):  APTT in the last 72 hours.  CARDIAC ENZYMES:No results for input(s): CKTOTAL, CKMB, CKMBINDEX, TROPONINI in the last 72 hours. FASTING LIPID PANEL:  Lab Results   Component Value Date/Time    CHOL 135 2019 12:30 PM    HDL 67 2019 12:30 PM    LDLCALC 36 2019 12:30 PM    TRIG 159 2019 12:30 PM     LIVER PROFILE:  Recent Labs     22  0527 22  0623   AST 25 21   ALT 15 13   LABALBU 3.3* 3.1*       Current Inpatient Medications:   furosemide  20 mg Oral BID    metoprolol succinate  12.5 mg Oral Daily    spironolactone  25 mg Oral QAM    vitamin B-6  50 mg Oral Daily    pantoprazole  40 mg IntraVENous BID    sodium chloride flush  5-40 mL IntraVENous 2 times per day    busPIRone  10 mg Oral BID    donepezil  10 mg Oral Nightly    DULoxetine  30 mg Oral QAM    gabapentin  100 mg Oral TID    levothyroxine  75 mcg Oral QAM    melatonin  10 mg Oral Nightly    memantine  10 mg Oral BID    QUEtiapine  12.5 mg Oral Nightly       IV Infusions (if any):   dextrose      sodium chloride      sodium chloride           PHYSICAL EXAM:     CONSTITUTIONAL:   BP (!) 101/57   Pulse 92   Temp 98.2 °F (36.8 °C) (Oral)   Resp 18   Ht 5' 8\" (1.727 m)   Wt 180 lb (81.6 kg)   LMP  (LMP Unknown)   SpO2 98%   BMI 27.37 kg/m²   Pulse  Av.1  Min: 83  Max: 92  Systolic (76SEH), MYU:272 , Min:100 , VAS:490    Diastolic (20VQI), RTT:56, Min:51, Max:72    In general, this is a well developed, well nourished who appears stated age, awake, alert, cooperative, no apparent distress  HEENT: eyes -conjunctivae pink,  Throat - Oral mucosa pink and moist.   Neck-  no stridor, no noted enlargement of the thyroid, no carotid bruit. no jugular venous distention   RESPIRATORY: Chest symmetrical and non-tender to palpation. No accessory muscle use. Lung auscultation - few rhonchi  CARDIOVASCULAR:     Heart Palpation - no palpable thrills   Heart Ausculation - Regular rate and rhythm, 2/6 systolic murmur, No s3 or rub.    No lower extremity edema, no varicosities. Distal pulses palpable, no clubbing  ABDOMEN: Soft,  Bowel sounds present. No Palpable masses   MS: n/a  : Deferred  Rectal Exam: Deferred  SKIN: warm and dry   NEURO / PSYCH: oriented to person, place        ASSESSMENT/PLAN:     Acute blood loss anemia - s/p Transfusion, GI following, Hgb 8    Acute HFrEF - ([facility] - Continue Diuretics; , Monitor Wts, I/Os. BP, Renal Fn    Elevated troponin - hS-troponin 135 --> 162 --> 258, No acute ST changes on EKG, Likely demand ischemia in setting of severe acute anemia; denied CP. Ischemic w/u recommended by Dr Rick Blas,  when clinically stable and H/H stable. I would recommend Medical therapy due to her co-morbidities and her limited code status. Cardiomyopathy - Ischemic vs Nonischemic  - EF 35-40%. Continue BB, Spironolactone. If BP tolerates, add low dose Entresto or ACEi or ARB. Add Out-Pt Jardiance; Ischemic w/u when clinically stable and H/H stable. Acute hypoxemic respiratory failure ([facility] -  Per Pulmoanry consultants    HLD - On statin therapy    Hypokalemia - Supplement Potassium    Hypothyroidism, on HRT    Dementia - on Aricept and Namenda therapy              No family at bed side  Above recommendations discussed with her.         Electronically signed by Manuel Santiago MD on 9/24/2022 at 9:48 AM  [facility]) Cardiology

## 2022-09-24 NOTE — PROGRESS NOTES
Internal Medicine Progress Note    [facility]    Merari Costa. Any Prieto, SHAKILAOJennyIJenny Call D.O., SHAKILAOCHARLEEN Mosqueda D.O. Coleman Roup, MSN, APRN, NP-C  Salena Andrews. Milton Vinson, MSN, APRN-CNP       Primary Care Physician: Fabricio Colón DO   Admitting Physician:  Merly Rogers DO  Admission date and time: 9/21/2022  9:18 AM    Room:  [address] [place] [address]  Admitting diagnosis: Acute blood loss anemia [D62]    Patient Name: Luis A Delgadillo  MRN: [id number]    Date of Service: 9/24/2022     Subjective:  Eber Nieves is a [de-identified] y.o. female who was seen and examined today,9/24/2022, at the bedside. Eber Nieves was evaluated in the presence of her daughter today. Multiple questions were answered and they have been updated accordingly. Endoscopic intervention was performed yesterday without evidence of any bleeding on EGD. Hemoglobin seems to be stabilizing at this point. Patient and her daughter have been updated regarding echocardiographic findings with plans for ischemic evaluation prior to discharge. Eber Nieves is consuming breakfast during my examination and has no current complaints. Review of System:   Constitutional:   Improving malaise and fatigue. HEENT:   Denies ear pain, sore throat, sinus or eye problems. Cardiovascular:   Denies any chest pain, irregular heartbeats, or palpitations. Respiratory:   Denies shortness of breath, coughing, sputum production, hemoptysis, or wheezing. Gastrointestinal:   Denies nausea, vomiting, diarrhea, or constipation. Denies any abdominal pain. Denies rectal bleeding or melanotic stool. In fact, she has not had a bowel movement since hospitalization. Genitourinary:    Denies any urgency, frequency, hematuria. Voiding without difficulty. Extremities:   Denies lower extremity swelling, edema or cyanosis.    Neurology:    Denies any headache or focal neurological deficits, admits to generalized weakness and deconditioning. Psch:   Denies being anxious or depressed. Musculoskeletal:    Denies  myalgias, joint complaints or back pain. Integumentary:   Denies any rashes, ulcers, or excoriations. Denies bruising. Hematologic/Lymphatic:  Denies bruising or bleeding. Physical Exam:  No intake/output data recorded. Intake/Output Summary (Last 24 hours) at 9/24/2022 0835  Last data filed at 9/23/2022 1752  Gross per 24 hour   Intake 460 ml   Output 400 ml   Net 60 ml   I/O last 3 completed shifts: In: 460 [P.O.:360; I.V.:100]  Out: 2700 [Urine:2700]  Patient Vitals for the past 96 hrs (Last 3 readings):   Weight   09/21/22 1543 180 lb (81.6 kg)   09/21/22 0944 180 lb 8 oz (81.9 kg)     Vital Signs:   Blood pressure (!) 100/51, pulse 92, temperature 98.5 °F (36.9 °C), temperature source Infrared, resp. rate 20, height 5' 8\" (1.727 m), weight 180 lb (81.6 kg), SpO2 95 %, not currently breastfeeding. General appearance:  Alert, responsive, oriented to person, place, and time. Well preserved, alert, no distress. Pleasantly confused in the setting of dementia. Head:  Normocephalic. No masses, lesions or tenderness. Eyes:  PERRLA. EOMI. Sclera clear. Buccal mucosa moist.  ENT:  Ears normal. Mucosa normal.  Neck:    Supple. Trachea midline. No thyromegaly. No JVD. No bruits. Heart:    Rhythm regular. Rate controlled. 1/6 systolic ejection murmur murmurs. Lungs:    Symmetrical. Clear to auscultation bilaterally. No wheezes. No rhonchi. No rales. Abdomen:   Soft. Non-tender. Non-distended. Bowel sounds positive. No organomegaly or masses. No pain on palpation. Extremities:    Peripheral pulses present. No peripheral edema. No ulcers. No cyanosis. No clubbing. Neurologic:    Alert x 3. No focal deficit. Cranial nerves grossly intact. No focal weakness. Psych:   Behavior is normal. Mood appears normal. Speech is not rapid and/or pressured.   As PROT 6.1 09/24/2022 06:23 AM    LABALBU 3.1 09/24/2022 06:23 AM     Recent Labs     09/22/22  0621   MG 2.2      Lab Results   Component Value Date/Time    PHOS 4.0 09/22/2022 06:21 AM     No results for input(s): TROPONINI in the last 72 hours. Assessment:  Multifactorial microcytic anemia with iron deficiency and suspicion for occult GI bleeding  Severe and newly identified cardiomyopathy with echocardiographic findings of regional wall motion abnormalities  Hypothyroidism on replacement therapy  Essential hypertension  Hyperlipidemia  Underlying dementia on Aricept and Namenda  History of tobacco abuse    Plan:   EGD was negative for active bleeding yesterday. Hemoglobin seems to have stabilized but we would like to maintain hemoglobin greater than 8 in the setting of her newly identified cardiovascular disease. IV iron protocol has been completed. Cardiology would like to move forward with cardiac catheterization if anemia stabilizes. Anemia will be monitored closely over the weekend. If there is an additional drop in hemoglobin, I will discuss colonoscopic evaluation versus bleeding scan with the GI team.  We are attempting to maximize cardiac medications pending blood pressure tolerance. IV diuresis will be transitioned to oral in the setting of improving volume status. She will continue working with therapy teams. As stated previously, her daughter was updated extensively at the bedside and is very appreciative of the care the patient is currently receiving. More than 50% of my time was spent at the bedside counseling/coordinating care with the patient and/or family with face to face contact. This time was spent reviewing notes and laboratory data as well as instructing and counseling the patient. Time I spent with the family or surrogate(s) is included only if the patient was incapable of providing the necessary information or participating in medical decisions.  I also discussed the differential diagnosis and all of the proposed management plans with the patient and individuals accompanying the patient. I am readily available for any further decision-making and intervention.        Roberto Hernandez DO, F.A.C.O.I.  9/24/2022  8:35 AM

## 2022-09-24 NOTE — PROGRESS NOTES
PROGRESS NOTE  By Joel Greene M.D. [facility]  Dr. Yulissa Hallman M.D.,  Dr. Stephanie Casiano M.D.,   Dr. Manas Alejandre D.O.,  Dr. Peggy Tripp M.D.,  Dr. Temo Gray D.O.,  Lavacajerrica Díaz Codi  [de-identified] y.o.  female    SUBJECTIVE:  Denies abdominal pain. Denies nausea vomiting    OBJECTIVE:    BP (!) 101/57   Pulse 92   Temp 98.2 °F (36.8 °C) (Oral)   Resp 18   Ht 5' 8\" (1.727 m)   Wt 180 lb (81.6 kg)   LMP  (LMP Unknown)   SpO2 98%   BMI 27.37 kg/m²     General: NAD/elderly  female  HEENT: Anicteric sclera/moist oral mucosa  Neck: Supple with trachea midline  Chest: CTA B/symmetric excursions  Cor: Regular/S1-S2  Abd.: Soft/nontender. Obese. BS +/4  Extr.:  Decreased muscle tone and bulk, consistent with age and condition  Skin: Warm and dry/anicteric      DATA:    Monitor data reviewed -sinus rhythm noted.        Lab Results   Component Value Date/Time    WBC 10.9 09/24/2022 06:23 AM    RBC 4.03 09/24/2022 06:23 AM    HGB 8.0 09/24/2022 06:23 AM    HCT 28.9 09/24/2022 06:23 AM    MCV 71.7 09/24/2022 06:23 AM    MCH 19.9 09/24/2022 06:23 AM    MCHC 27.7 09/24/2022 06:23 AM    RDW 25.6 09/24/2022 06:23 AM     09/24/2022 06:23 AM    MPV 9.9 09/24/2022 06:23 AM     Lab Results   Component Value Date/Time     09/24/2022 06:23 AM    K 3.4 09/24/2022 06:23 AM    K 4.1 09/21/2022 09:55 AM     09/24/2022 06:23 AM    CO2 25 09/24/2022 06:23 AM    BUN 15 09/24/2022 06:23 AM    CREATININE 0.8 09/24/2022 06:23 AM    CALCIUM 9.2 09/24/2022 06:23 AM    PROT 6.1 09/24/2022 06:23 AM    LABALBU 3.1 09/24/2022 06:23 AM    BILITOT 0.7 09/24/2022 06:23 AM    ALKPHOS 139 09/24/2022 06:23 AM    AST 21 09/24/2022 06:23 AM    ALT 13 09/24/2022 06:23 AM     No results found for: LIPASE  No results found for: AMYLASE      ASSESSMENT/PLAN:  Patient Active Problem List   Diagnosis    Memory change    Hyperlipidemia    Hypertension    Neuropathy    Hypothyroid H/O total hysterectomy    Calculus of gallbladder without cholecystitis without obstruction    Left inguinal hernia    Late onset Alzheimer's disease without behavioral disturbance (HCC)    Acute blood loss anemia    Acute decompensated heart failure (HCC)    Elevated troponin    Acute hypoxemic respiratory failure ([facility] [address]        1. Anemia  -Acute on chronic  -No evidence of overt bleed  -Significant iron deficient  -EGD 9/23 revealing no bleeding source  -Consider colonoscopy next week after 2-day prep given patient advanced age and comorbidities. - day PPI     2. Elevated troponin  -Likely demand ischemia  -Per admitting/cardiology      Driss Steel MD  9/24/2022  11:26 AM    NOTE:  This report was transcribed using voice recognition software. Every effort was made to ensure accuracy; however, inadvertent computerized transcription errors may be present.

## 2022-09-25 LAB
ALBUMIN SERPL-MCNC: 3.7 G/DL (ref 3.5–5.2)
ALP BLD-CCNC: 143 U/L (ref 35–104)
ALT SERPL-CCNC: 12 U/L (ref 0–32)
ANION GAP SERPL CALCULATED.3IONS-SCNC: 8 MMOL/L (ref 7–16)
ANISOCYTOSIS: ABNORMAL
AST SERPL-CCNC: 24 U/L (ref 0–31)
BASOPHILS ABSOLUTE: 0.1 E9/L (ref 0–0.2)
BASOPHILS RELATIVE PERCENT: 0.9 % (ref 0–2)
BILIRUB SERPL-MCNC: 0.7 MG/DL (ref 0–1.2)
BUN BLDV-MCNC: 18 MG/DL (ref 6–23)
BURR CELLS: ABNORMAL
CALCIUM SERPL-MCNC: 9 MG/DL (ref 8.6–10.2)
CHLORIDE BLD-SCNC: 101 MMOL/L (ref 98–107)
CO2: 29 MMOL/L (ref 22–29)
CREAT SERPL-MCNC: 0.8 MG/DL (ref 0.5–1)
EOSINOPHILS ABSOLUTE: 0.79 E9/L (ref 0.05–0.5)
EOSINOPHILS RELATIVE PERCENT: 7 % (ref 0–6)
GFR AFRICAN AMERICAN: >60
GFR NON-AFRICAN AMERICAN: >60 ML/MIN/1.73
GLUCOSE BLD-MCNC: 101 MG/DL (ref 74–99)
HCT VFR BLD CALC: 30.4 % (ref 34–48)
HEMOGLOBIN: 8.5 G/DL (ref 11.5–15.5)
HYPOCHROMIA: ABNORMAL
IMMATURE GRANULOCYTES #: 0.04 E9/L
IMMATURE GRANULOCYTES %: 0.4 % (ref 0–5)
LYMPHOCYTES ABSOLUTE: 1.63 E9/L (ref 1.5–4)
LYMPHOCYTES RELATIVE PERCENT: 14.5 % (ref 20–42)
MCH RBC QN AUTO: 20.3 PG (ref 26–35)
MCHC RBC AUTO-ENTMCNC: 28 % (ref 32–34.5)
MCV RBC AUTO: 72.7 FL (ref 80–99.9)
MONOCYTES ABSOLUTE: 0.92 E9/L (ref 0.1–0.95)
MONOCYTES RELATIVE PERCENT: 8.2 % (ref 2–12)
NEUTROPHILS ABSOLUTE: 7.79 E9/L (ref 1.8–7.3)
NEUTROPHILS RELATIVE PERCENT: 69 % (ref 43–80)
OVALOCYTES: ABNORMAL
PDW BLD-RTO: 27.8 FL (ref 11.5–15)
PLATELET # BLD: 332 E9/L (ref 130–450)
PMV BLD AUTO: 9.9 FL (ref 7–12)
POIKILOCYTES: ABNORMAL
POLYCHROMASIA: ABNORMAL
POTASSIUM SERPL-SCNC: 3.9 MMOL/L (ref 3.5–5)
RBC # BLD: 4.18 E12/L (ref 3.5–5.5)
SODIUM BLD-SCNC: 138 MMOL/L (ref 132–146)
TARGET CELLS: ABNORMAL
TEAR DROP CELLS: ABNORMAL
TOTAL PROTEIN: 6.5 G/DL (ref 6.4–8.3)
WBC # BLD: 11.3 E9/L (ref 4.5–11.5)

## 2022-09-25 PROCEDURE — 36415 COLL VENOUS BLD VENIPUNCTURE: CPT

## 2022-09-25 PROCEDURE — 6370000000 HC RX 637 (ALT 250 FOR IP): Performed by: HOSPITALIST

## 2022-09-25 PROCEDURE — 6370000000 HC RX 637 (ALT 250 FOR IP): Performed by: INTERNAL MEDICINE

## 2022-09-25 PROCEDURE — 1200000000 HC SEMI PRIVATE

## 2022-09-25 PROCEDURE — 97165 OT EVAL LOW COMPLEX 30 MIN: CPT | Performed by: OCCUPATIONAL THERAPIST

## 2022-09-25 PROCEDURE — 97530 THERAPEUTIC ACTIVITIES: CPT | Performed by: OCCUPATIONAL THERAPIST

## 2022-09-25 PROCEDURE — 2580000003 HC RX 258: Performed by: INTERNAL MEDICINE

## 2022-09-25 PROCEDURE — 85025 COMPLETE CBC W/AUTO DIFF WBC: CPT

## 2022-09-25 PROCEDURE — 80053 COMPREHEN METABOLIC PANEL: CPT

## 2022-09-25 RX ADMIN — GABAPENTIN 100 MG: 100 CAPSULE ORAL at 08:58

## 2022-09-25 RX ADMIN — METOPROLOL SUCCINATE 12.5 MG: 25 TABLET, EXTENDED RELEASE ORAL at 08:58

## 2022-09-25 RX ADMIN — SPIRONOLACTONE 25 MG: 25 TABLET ORAL at 08:58

## 2022-09-25 RX ADMIN — BUSPIRONE HYDROCHLORIDE 10 MG: 10 TABLET ORAL at 08:57

## 2022-09-25 RX ADMIN — PYRIDOXINE HCL TAB 50 MG 50 MG: 50 TAB at 08:58

## 2022-09-25 RX ADMIN — POLYETHYLENE GLYCOL-3350 AND ELECTROLYTES 4000 ML: 236; 6.74; 5.86; 2.97; 22.74 POWDER, FOR SOLUTION ORAL at 16:00

## 2022-09-25 RX ADMIN — GABAPENTIN 100 MG: 100 CAPSULE ORAL at 21:40

## 2022-09-25 RX ADMIN — DONEPEZIL HYDROCHLORIDE 10 MG: 5 TABLET, FILM COATED ORAL at 21:40

## 2022-09-25 RX ADMIN — PANTOPRAZOLE SODIUM 40 MG: 40 TABLET, DELAYED RELEASE ORAL at 06:20

## 2022-09-25 RX ADMIN — Medication 10 ML: at 21:39

## 2022-09-25 RX ADMIN — Medication 10 ML: at 09:05

## 2022-09-25 RX ADMIN — GABAPENTIN 100 MG: 100 CAPSULE ORAL at 16:00

## 2022-09-25 RX ADMIN — Medication 10 MG: at 21:40

## 2022-09-25 RX ADMIN — QUETIAPINE FUMARATE 12.5 MG: 25 TABLET ORAL at 21:40

## 2022-09-25 RX ADMIN — MEMANTINE HYDROCHLORIDE 10 MG: 10 TABLET ORAL at 21:40

## 2022-09-25 RX ADMIN — BUSPIRONE HYDROCHLORIDE 10 MG: 10 TABLET ORAL at 21:40

## 2022-09-25 RX ADMIN — DULOXETINE HYDROCHLORIDE 30 MG: 30 CAPSULE, DELAYED RELEASE ORAL at 08:58

## 2022-09-25 RX ADMIN — LEVOTHYROXINE SODIUM 75 MCG: 0.07 TABLET ORAL at 08:58

## 2022-09-25 RX ADMIN — FUROSEMIDE 20 MG: 20 TABLET ORAL at 17:24

## 2022-09-25 RX ADMIN — MEMANTINE HYDROCHLORIDE 10 MG: 10 TABLET ORAL at 08:57

## 2022-09-25 RX ADMIN — FUROSEMIDE 20 MG: 20 TABLET ORAL at 08:58

## 2022-09-25 NOTE — PROGRESS NOTES
self-care routine  * Functional transfer/mobility training/DME recommendations for increased independence, safety, and fall prevention  * Patient/Family education to increase follow through with safety techniques and functional independence  * Recommendation of environmental modifications for increased safety with functional transfers/mobility and ADLs  * Cognitive retraining/development of therapeutic activities to improve problem solving, judgement, memory, and attention for increased safety/participation in ADL/IADL tasks  * Therapeutic exercise to improve motor endurance, ROM, and functional strength for ADLs/functional transfers  * Therapeutic activities to facilitate/challenge dynamic balance, stand tolerance for increased safety and independence with ADLs  * Therapeutic activities to facilitate gross/fine motor skills for increased independence with ADLs  * Neuro-muscular re-education: facilitation of righting/equilibrium reactions, midline orientation, scapular stability/mobility, normalization of muscle tone, and facilitation of volitional active controled movement  * Positioning to improve skin integrity, interaction with environment and functional independence     Recommended Adaptive Equipment/DME:  TBD      Home Living: Pt lives in an [employer]. Daughter is nurse and works 12 hour shifts. Bathroom setup: walk in shower with shower chair and grab bars; grab bars by toilet    DME owned: none     Prior Level of Function: indep with ADLs mostly and supervision for showers, indep-,min A with IADLs; ambulated indep   Driving: no   Occupation: none   Enjoys: word finds, bingo, kathy, leaves weekly [place] socialize with [place] or [place], [facility] channel    Pain Level: none  Cognition: A&O: 3/4 confusion to situation overall;  Follows 3 step directions   Memory:  fair- with short term memory limitations PTA   Sequencing:  Intact   Problem solving:  fair   Judgement/safety:  fair     AM-PAC Daily Activity Inpatient   How much help for putting on and taking off regular lower body clothing?: A Lot  How much help for Bathing?: A Lot  How much help for Toileting?: A Lot  How much help for putting on and taking off regular upper body clothing?: A Lot  How much help for taking care of personal grooming?: A Little  How much help for eating meals?: A Little  AM-PAC Inpatient Daily Activity Raw Score: 14  AM-PAC Inpatient ADL T-Scale Score : 33.39  ADL Inpatient CMS 0-100% Score: 59.67  ADL Inpatient CMS G-Code Modifier : CK    Functional Assessment:     Initial Eval Status  Date: 9/25/2022   Treatment Status  Date: STGs = LTGs  Time frame: 10-14 days   Feeding Supervision   Indep   Grooming Supervision and set up only  Independent    UB Dressing Moderate Assist with daughter assisting, suspected pt could complete at highest level of indep  Stand by Assist    LB Dressing Moderate Assist with daughter assisting, suspected pt could complete at highest level of indep  Stand by Assist    Bathing Moderate Assist with daughter assisting, suspected pt could complete at highest level of indep  Stand by Assist    Toileting Moderate Assist with daughter reporting some impaction and therapist reported to nursing  Stand by Assist    Bed Mobility  NT with pt up in arm chair on arrival  Supine to sit:  Independent   Sit to supine: Independent    Functional Transfers Stand by Assist without AD from arm chair  Modified [place]    Functional Mobility Stand by Assist without AD  Modified [place]    Balance Sitting:     Static:  good    Dynamic:good  Standing: fair    Standing: fair+   Activity Tolerance Vitals with activity: WFL overall with fair+ tolerance to ADLs and functional standing  2min average  Increase standing tolerance for >3min with stable vital signs for carry over into toileting, functional tranfers and indep in ADLs   Visual/  Perceptual Glasses: Present; WFL    Reports change in vision since admission: No     NA Hand Dominance  [x] Right  [] Left    AROM (PROM) Strength Additional Info:  Goal:   RUE  WFL 4/5 good  and  FMC/dexterity noted during ADL tasks  Opposition [x] Intact [] Impaired  Finger to nose [x] Intact [] Impaired /5MMT generally for carry over into self care, functional transfers and functional mobility with AD. LUE WFL 4/5 good  and  FMC/dexterity noted during ADL tasks  Opposition [x] Intact [] Impaired  Finger to nose [x] Intact [] Impaired 5/5MMT generally for carry over into self care, functional transfers and functional mobility with AD. Hearing: WFL   Sensation:  No c/o numbness or tingling   Tone: WFL   Edema: none    Comments: Upon arrival patient sitting up in arm chair with daughter getting washed up and dressed. Pt required mod A for most UB ADLs and mod A LB ADLs tasks. Limited with SBA for standing during LB ADLs and functional transfers. The biggest barriers reflect that of functional transfers, functional mobility, UB/LB ADLs, cognition, activity tolerance, balance, safety and strengthening. At end of session, patient sitting up in arm chair with call light and phone within reach, all lines and tubes intact. Overall patient demonstrated decreased independence and safety during completion of ADL/functional transfer/mobility tasks compared to PLOF. Nursing updated on pt position and status following OT eval. Pt would benefit from continued skilled OT to increase safety and independence with completion of ADL/IADL tasks for functional independence and quality of life. Treatment: OT treatment provided this date includes:  Instruction, education and training on safe facilitation and adapted techniques for completion of ADLs.  These include neuromuscular reeducation to facilitate balance/righting reactions,safe functional transfer techniques, proper positioning/alignment to improve interaction with environment and overall function and on adapted techniques/work simplification for completion of ADLs. Education provided on hand/feet placement with arm chair and body mechanics for fall prevention. Extended time to complete all tasks, including skilled monitoring of patient's response during treatment session. Prior to and at the end of session, environmental modifications / line management completed for patients safety and efficiency of treatment session. See above for further details. Rehab Potential: Good for established goals     Patient / Family Goal: DC home to [facility]      Patient and/or family were instructed on functional diagnosis, prognosis/goals and OT plan of care. Demonstrated good understanding. Eval Complexity: Low  History: Brief review of medical records and additional review of physical, cognitive, or psychosocial history related to current functional performance  Exam: 3+ performance deficits  Assistance/Modification: Mod assistance or modifications required to perform tasks. May have comorbidities that affect occupational performance. Time In: 0936  Time Out: 1006  Total Treatment Time: 10    Min Units   OT Eval Low 97165  x  1   OT Eval Medium 13445      OT Eval High 30692      OT Re-Eval [id number]       Therapeutic Ex 54732       Therapeutic Activities 43833  10 1    ADL/Self Care 29007       Orthotic Management 00129       Manual [id number]     Neuro Re-Ed 17629       Non-Billable Time          Evaluation Time additionally includes thorough review of current medical information, gathering information on past medical history/social history and prior level of function, interpretation of standardized testing/informal observation of tasks, assessment of data and development of plan of care and goals.             Trevor Friedman OTR/L; [id number]

## 2022-09-25 NOTE — PROGRESS NOTES
Internal Medicine Progress Note    [facility]    Brain Zack. Evon Silva., F.A.C.O.I. Haleigh Castro D.O., CHARLEEN Pedersen D.O. Burl Hire, MSN, APRN, NP-C  Loren Chavis. Tutwiler Friday, MSN, APRN-CNP       Primary Care Physician: Jose Gipson DO   Admitting Physician:  Adina Rivero DO  Admission date and time: 9/21/2022  9:18 AM    Room:  [facility]0421-01  Admitting diagnosis: Acute blood loss anemia [D62]    Patient Name: Meg Lincoln  MRN: [id number]    Date of Service: 9/25/2022     Subjective:  Missy Fuller is a [de-identified] y.o. female who was seen and examined today,9/25/2022, at the bedside. Missy Fuller is eating breakfast during my examination today resting very comfortably. Her daughter was not present this morning but was updated at the bedside yesterday. Hemoglobin has stabilized at this point. GI and cardiovascular recommendations have been reviewed. Review of System:   Constitutional:   Improving malaise and fatigue. HEENT:   Denies ear pain, sore throat, sinus or eye problems. Cardiovascular:   Denies any chest pain, irregular heartbeats, or palpitations. Respiratory:   Denies shortness of breath, coughing, sputum production, hemoptysis, or wheezing. Gastrointestinal:   Denies nausea, vomiting, diarrhea, or constipation. Denies any abdominal pain. Denies rectal bleeding or melanotic stool. In fact, she has not had a bowel movement since hospitalization. Genitourinary:    Denies any urgency, frequency, hematuria. Voiding without difficulty. Extremities:   Denies lower extremity swelling, edema or cyanosis. Neurology:    Denies any headache or focal neurological deficits, admits to generalized weakness and deconditioning. Psch:   Denies being anxious or depressed. Musculoskeletal:    Denies  myalgias, joint complaints or back pain.    Integumentary:   Denies any rashes, ulcers, or excoriations. Denies bruising. Hematologic/Lymphatic:  Denies bruising or bleeding. Physical Exam:  No intake/output data recorded. No intake or output data in the 24 hours ending 09/25/22 0855  No intake/output data recorded. Patient Vitals for the past 96 hrs (Last 3 readings):   Weight   09/21/22 1543 180 lb (81.6 kg)   09/21/22 0944 180 lb 8 oz (81.9 kg)     Vital Signs:   Blood pressure 112/69, pulse 86, temperature 98.9 °F (37.2 °C), temperature source Oral, resp. rate 16, height 5' 8\" (1.727 m), weight 180 lb (81.6 kg), SpO2 94 %, not currently breastfeeding. General appearance:  Alert, responsive, oriented to person, place, and time. Well preserved, alert, no distress. Pleasantly confused in the setting of dementia. Head:  Normocephalic. No masses, lesions or tenderness. Eyes:  PERRLA. EOMI. Sclera clear. Buccal mucosa moist.  ENT:  Ears normal. Mucosa normal.  Neck:    Supple. Trachea midline. No thyromegaly. No JVD. No bruits. Heart:    Rhythm regular. Rate controlled. 1/6 systolic ejection murmur murmurs. Lungs:    Symmetrical. Clear to auscultation bilaterally. No wheezes. No rhonchi. No rales. Abdomen:   Soft. Non-tender. Non-distended. Bowel sounds positive. No organomegaly or masses. No pain on palpation. Extremities:    Peripheral pulses present. No peripheral edema. No ulcers. No cyanosis. No clubbing. Neurologic:    Alert x 3. No focal deficit. Cranial nerves grossly intact. No focal weakness. Psych:   Behavior is normal. Mood appears normal. Speech is not rapid and/or pressured. As stated previously, pleasantly confused with dementia. Musculoskeletal:   Spine ROM normal. Muscular strength intact. Gait not assessed. Integumentary:  No rashes  Skin normal color and texture.   Genitalia/Breast:  Deferred    Medication:  Scheduled Meds:   furosemide  20 mg Oral BID    pantoprazole  40 mg Oral QAM AC    metoprolol succinate  12.5 mg Oral Daily    spironolactone 25 mg Oral QAM    vitamin B-6  50 mg Oral Daily    sodium chloride flush  5-40 mL IntraVENous 2 times per day    busPIRone  10 mg Oral BID    donepezil  10 mg Oral Nightly    DULoxetine  30 mg Oral QAM    gabapentin  100 mg Oral TID    levothyroxine  75 mcg Oral QAM    melatonin  10 mg Oral Nightly    memantine  10 mg Oral BID    QUEtiapine  12.5 mg Oral Nightly     Continuous Infusions:   dextrose      sodium chloride      sodium chloride         Objective Data:  Recent Labs     09/23/22  0527 09/23/22  1726 09/24/22  0623 09/24/22  1413 09/24/22  2147 09/25/22  0551   WBC 10.7  --  10.9  --   --  11.3   RBC 4.14  --  4.03  --   --  4.18   HGB 8.1*   < > 8.0* 8.2* 8.4* 8.5*   HCT 28.7*   < > 28.9* 29.6* 30.0* 30.4*   MCV 69.3*  --  71.7*  --   --  72.7*   MCH 19.6*  --  19.9*  --   --  20.3*   MCHC 28.2*  --  27.7*  --   --  28.0*   RDW 23.9*  --  25.6*  --   --  27.8*     --  341  --   --  332   MPV 9.8  --  9.9  --   --  9.9    < > = values in this interval not displayed. Lab Results   Component Value Date/Time     09/25/2022 05:51 AM    K 3.9 09/25/2022 05:51 AM    K 4.1 09/21/2022 09:55 AM     09/25/2022 05:51 AM    CO2 29 09/25/2022 05:51 AM    ANIONGAP 8 09/25/2022 05:51 AM    GLUCOSE 101 09/25/2022 05:51 AM    BUN 18 09/25/2022 05:51 AM    CREATININE 0.8 09/25/2022 05:51 AM    GFRAA >60 09/25/2022 05:51 AM    LABGLOM >60 09/25/2022 05:51 AM    CALCIUM 9.0 09/25/2022 05:51 AM    BILITOT 0.7 09/25/2022 05:51 AM    ALT 12 09/25/2022 05:51 AM    AST 24 09/25/2022 05:51 AM    ALKPHOS 143 09/25/2022 05:51 AM    PROT 6.5 09/25/2022 05:51 AM    LABALBU 3.7 09/25/2022 05:51 AM     Recent Labs     09/22/22  0621   MG 2.2      Lab Results   Component Value Date/Time    PHOS 4.0 09/22/2022 06:21 AM     No results for input(s): TROPONINI in the last 72 hours.       Assessment:  Multifactorial microcytic anemia with iron deficiency and suspicion for occult GI bleeding  Severe and newly identified cardiomyopathy with echocardiographic findings of regional wall motion abnormalities  Hypothyroidism on replacement therapy  Essential hypertension  Hyperlipidemia  Underlying dementia on Aricept and Namenda  History of tobacco abuse    Plan:   Hemoglobin has stabilized at this point with no outward signs of GI bleeding. Both the GI and cardiovascular teams are following. Considerations for colonoscopy as per the GI team, but per my discussion with the patient this morning, she wishes to avoid this if possible. Her daughter was not present for this examination. Cardiology is recommending a more conservative plan moving forward with maximization of medications as opposed to cardiac catheterization in the setting of multiple comorbidities. If no endoscopic intervention is planned, I would recommend advancement in her diet and this will be deferred to the GI team.  We will continue maximizing cardiac medications as blood pressure allows. She is otherwise ambulatory and feeling much better. As stated previously, if no aggressive intervention is planned, she would likely be acceptable for discharge home tomorrow. More than 50% of my time was spent at the bedside counseling/coordinating care with the patient and/or family with face to face contact. This time was spent reviewing notes and laboratory data as well as instructing and counseling the patient. Time I spent with the family or surrogate(s) is included only if the patient was incapable of providing the necessary information or participating in medical decisions. I also discussed the differential diagnosis and all of the proposed management plans with the patient and individuals accompanying the patient. I am readily available for any further decision-making and intervention.        Jeff Tolbert DO, F.A.C.O.I.  9/25/2022  8:55 AM

## 2022-09-25 NOTE — PLAN OF CARE
Problem: ABCDS Injury Assessment  Goal: Absence of physical injury  Outcome: Progressing     Problem: Pain  Goal: Verbalizes/displays adequate comfort level or baseline comfort level  Outcome: Progressing     Problem: Safety - Adult  Goal: Free from fall injury  Outcome: Progressing     Problem: Skin/Tissue Integrity  Goal: Absence of new skin breakdown  Description: 1. Monitor for areas of redness and/or skin breakdown  2. Assess vascular access sites hourly  3. Every 4-6 hours minimum:  Change oxygen saturation probe site  4. Every 4-6 hours:  If on nasal continuous positive airway pressure, respiratory therapy assess nares and determine need for appliance change or resting period.   Outcome: Progressing

## 2022-09-25 NOTE — PROGRESS NOTES
PROGRESS NOTE  By Driss Steel M.D. [facility]  Dr. Marguerite Linares M.D.,  Dr. Gurpreet Guadarrama M.D.,   Dr. Yesica Miller D.O.,  Dr. Rebecca Gallo M.D.,  Dr. Marya Dorsey D.O.,  Kinsey Guidry  [de-identified] y.o.  female    SUBJECTIVE:  No new complaints. Daughter at bedside    OBJECTIVE:    /69   Pulse 86   Temp 98.9 °F (37.2 °C) (Oral)   Resp 16   Ht 5' 8\" (1.727 m)   Wt 180 lb (81.6 kg)   LMP  (LMP Unknown)   SpO2 94%   BMI 27.37 kg/m²     General: NAD/elderly  female  HEENT: Anicteric sclera/moist oral mucosa  Neck: Supple with trachea midline  Chest: Symmetric excursion/nonlabored respirations  Cor: Appears regular  Abd.: Soft and obese  Extr.:  Decreased muscle tone and bulk throughout  Skin: Warm and dry/anicteric      DATA:    Monitor data reviewed -sinus rhythm noted.        Lab Results   Component Value Date/Time    WBC 11.3 09/25/2022 05:51 AM    RBC 4.18 09/25/2022 05:51 AM    HGB 8.5 09/25/2022 05:51 AM    HCT 30.4 09/25/2022 05:51 AM    MCV 72.7 09/25/2022 05:51 AM    MCH 20.3 09/25/2022 05:51 AM    MCHC 28.0 09/25/2022 05:51 AM    RDW 27.8 09/25/2022 05:51 AM     09/25/2022 05:51 AM    MPV 9.9 09/25/2022 05:51 AM     Lab Results   Component Value Date/Time     09/25/2022 05:51 AM    K 3.9 09/25/2022 05:51 AM    K 4.1 09/21/2022 09:55 AM     09/25/2022 05:51 AM    CO2 29 09/25/2022 05:51 AM    BUN 18 09/25/2022 05:51 AM    CREATININE 0.8 09/25/2022 05:51 AM    CALCIUM 9.0 09/25/2022 05:51 AM    PROT 6.5 09/25/2022 05:51 AM    LABALBU 3.7 09/25/2022 05:51 AM    BILITOT 0.7 09/25/2022 05:51 AM    ALKPHOS 143 09/25/2022 05:51 AM    AST 24 09/25/2022 05:51 AM    ALT 12 09/25/2022 05:51 AM     No results found for: LIPASE  No results found for: AMYLASE      ASSESSMENT/PLAN:  Patient Active Problem List   Diagnosis    Memory change    Hyperlipidemia    Hypertension    Neuropathy    Hypothyroid    H/O total hysterectomy Calculus of gallbladder without cholecystitis without obstruction    Left inguinal hernia    Late onset Alzheimer's disease without behavioral disturbance (HCC)    Acute blood loss anemia    Acute decompensated heart failure (HCC)    Elevated troponin    Acute hypoxemic respiratory failure ([facility] [address]     1. Anemia  -Acute on chronic  -No evidence of overt bleed  -Significant iron deficient  -EGD 9/23 revealing no bleeding source  -Consider colonoscopy next week after 2-day prep given patient advanced age and comorbidities -see discussion below  - day PPI     2. Elevated troponin  -Likely demand ischemia  -Per admitting/cardiology    Above has been discussed in detail with patient and her daughter at bedside. Patient's daughter would prefer her mother to have her colonoscopy in the hospital -we will plan for tomorrow or Tuesday depending on patient ability to prep. Please, see orders for plan of care. France Warner MD  9/25/2022  11:05 AM    NOTE:  This report was transcribed using voice recognition software. Every effort was made to ensure accuracy; however, inadvertent computerized transcription errors may be present.

## 2022-09-26 ENCOUNTER — ANESTHESIA EVENT (OUTPATIENT)
Dept: ENDOSCOPY | Age: 82
DRG: 811 | End: 2022-09-26
Payer: COMMERCIAL

## 2022-09-26 ENCOUNTER — ANESTHESIA (OUTPATIENT)
Dept: ENDOSCOPY | Age: 82
DRG: 811 | End: 2022-09-26
Payer: COMMERCIAL

## 2022-09-26 LAB
ALBUMIN SERPL-MCNC: 3.4 G/DL (ref 3.5–5.2)
ALP BLD-CCNC: 150 U/L (ref 35–104)
ALT SERPL-CCNC: 10 U/L (ref 0–32)
ANION GAP SERPL CALCULATED.3IONS-SCNC: 12 MMOL/L (ref 7–16)
ANISOCYTOSIS: ABNORMAL
AST SERPL-CCNC: 21 U/L (ref 0–31)
BASOPHILS ABSOLUTE: 0.23 E9/L (ref 0–0.2)
BASOPHILS RELATIVE PERCENT: 2.7 % (ref 0–2)
BILIRUB SERPL-MCNC: 0.6 MG/DL (ref 0–1.2)
BUN BLDV-MCNC: 9 MG/DL (ref 6–23)
BURR CELLS: ABNORMAL
CALCIUM SERPL-MCNC: 9.4 MG/DL (ref 8.6–10.2)
CHLORIDE BLD-SCNC: 105 MMOL/L (ref 98–107)
CO2: 26 MMOL/L (ref 22–29)
CREAT SERPL-MCNC: 0.7 MG/DL (ref 0.5–1)
EKG ATRIAL RATE: 90 BPM
EKG P AXIS: 65 DEGREES
EKG P-R INTERVAL: 154 MS
EKG Q-T INTERVAL: 372 MS
EKG QRS DURATION: 78 MS
EKG QTC CALCULATION (BAZETT): 455 MS
EKG R AXIS: 6 DEGREES
EKG T AXIS: -2 DEGREES
EKG VENTRICULAR RATE: 90 BPM
EOSINOPHILS ABSOLUTE: 0.29 E9/L (ref 0.05–0.5)
EOSINOPHILS RELATIVE PERCENT: 3.5 % (ref 0–6)
GFR AFRICAN AMERICAN: >60
GFR NON-AFRICAN AMERICAN: >60 ML/MIN/1.73
GLUCOSE BLD-MCNC: 94 MG/DL (ref 74–99)
HCT VFR BLD CALC: 30.5 % (ref 34–48)
HEMOGLOBIN: 8.5 G/DL (ref 11.5–15.5)
HYPOCHROMIA: ABNORMAL
LYMPHOCYTES ABSOLUTE: 1.26 E9/L (ref 1.5–4)
LYMPHOCYTES RELATIVE PERCENT: 15 % (ref 20–42)
MCH RBC QN AUTO: 20.6 PG (ref 26–35)
MCHC RBC AUTO-ENTMCNC: 27.9 % (ref 32–34.5)
MCV RBC AUTO: 74 FL (ref 80–99.9)
MONOCYTES ABSOLUTE: 0.34 E9/L (ref 0.1–0.95)
MONOCYTES RELATIVE PERCENT: 3.5 % (ref 2–12)
NEUTROPHILS ABSOLUTE: 6.3 E9/L (ref 1.8–7.3)
NEUTROPHILS RELATIVE PERCENT: 75.2 % (ref 43–80)
NUCLEATED RED BLOOD CELLS: 0.9 /100 WBC
OVALOCYTES: ABNORMAL
PDW BLD-RTO: 28.9 FL (ref 11.5–15)
PLATELET # BLD: 333 E9/L (ref 130–450)
PMV BLD AUTO: 9.9 FL (ref 7–12)
POIKILOCYTES: ABNORMAL
POLYCHROMASIA: ABNORMAL
POTASSIUM SERPL-SCNC: 3.6 MMOL/L (ref 3.5–5)
RBC # BLD: 4.12 E12/L (ref 3.5–5.5)
SARS-COV-2, NAAT: NOT DETECTED
SODIUM BLD-SCNC: 143 MMOL/L (ref 132–146)
TEAR DROP CELLS: ABNORMAL
TOTAL PROTEIN: 6.2 G/DL (ref 6.4–8.3)
TROPONIN, HIGH SENSITIVITY: 66 NG/L (ref 0–9)
WBC # BLD: 8.4 E9/L (ref 4.5–11.5)

## 2022-09-26 PROCEDURE — 84484 ASSAY OF TROPONIN QUANT: CPT

## 2022-09-26 PROCEDURE — 3609027000 HC COLONOSCOPY: Performed by: INTERNAL MEDICINE

## 2022-09-26 PROCEDURE — 3700000000 HC ANESTHESIA ATTENDED CARE: Performed by: INTERNAL MEDICINE

## 2022-09-26 PROCEDURE — 6370000000 HC RX 637 (ALT 250 FOR IP): Performed by: INTERNAL MEDICINE

## 2022-09-26 PROCEDURE — 7100000011 HC PHASE II RECOVERY - ADDTL 15 MIN: Performed by: INTERNAL MEDICINE

## 2022-09-26 PROCEDURE — 6360000002 HC RX W HCPCS: Performed by: INTERNAL MEDICINE

## 2022-09-26 PROCEDURE — 80053 COMPREHEN METABOLIC PANEL: CPT

## 2022-09-26 PROCEDURE — 0DJD8ZZ INSPECTION OF LOWER INTESTINAL TRACT, VIA NATURAL OR ARTIFICIAL OPENING ENDOSCOPIC: ICD-10-PCS | Performed by: INTERNAL MEDICINE

## 2022-09-26 PROCEDURE — 2580000003 HC RX 258: Performed by: INTERNAL MEDICINE

## 2022-09-26 PROCEDURE — 2709999900 HC NON-CHARGEABLE SUPPLY: Performed by: INTERNAL MEDICINE

## 2022-09-26 PROCEDURE — 99233 SBSQ HOSP IP/OBS HIGH 50: CPT | Performed by: INTERNAL MEDICINE

## 2022-09-26 PROCEDURE — 7100000010 HC PHASE II RECOVERY - FIRST 15 MIN: Performed by: INTERNAL MEDICINE

## 2022-09-26 PROCEDURE — 1200000000 HC SEMI PRIVATE

## 2022-09-26 PROCEDURE — 3700000001 HC ADD 15 MINUTES (ANESTHESIA): Performed by: INTERNAL MEDICINE

## 2022-09-26 PROCEDURE — 93005 ELECTROCARDIOGRAM TRACING: CPT | Performed by: INTERNAL MEDICINE

## 2022-09-26 PROCEDURE — 85025 COMPLETE CBC W/AUTO DIFF WBC: CPT

## 2022-09-26 PROCEDURE — 36415 COLL VENOUS BLD VENIPUNCTURE: CPT

## 2022-09-26 PROCEDURE — 2580000003 HC RX 258

## 2022-09-26 PROCEDURE — 87635 SARS-COV-2 COVID-19 AMP PRB: CPT

## 2022-09-26 PROCEDURE — 6360000002 HC RX W HCPCS

## 2022-09-26 RX ORDER — SODIUM CHLORIDE, SODIUM LACTATE, POTASSIUM CHLORIDE, CALCIUM CHLORIDE 600; 310; 30; 20 MG/100ML; MG/100ML; MG/100ML; MG/100ML
INJECTION, SOLUTION INTRAVENOUS CONTINUOUS PRN
Status: DISCONTINUED | OUTPATIENT
Start: 2022-09-26 | End: 2022-09-26 | Stop reason: SDUPTHER

## 2022-09-26 RX ORDER — PROPOFOL 10 MG/ML
INJECTION, EMULSION INTRAVENOUS PRN
Status: DISCONTINUED | OUTPATIENT
Start: 2022-09-26 | End: 2022-09-26 | Stop reason: SDUPTHER

## 2022-09-26 RX ORDER — METOPROLOL SUCCINATE 25 MG/1
25 TABLET, EXTENDED RELEASE ORAL DAILY
Status: DISCONTINUED | OUTPATIENT
Start: 2022-09-27 | End: 2022-09-27 | Stop reason: HOSPADM

## 2022-09-26 RX ORDER — ASPIRIN 81 MG/1
81 TABLET ORAL DAILY
Status: DISCONTINUED | OUTPATIENT
Start: 2022-09-27 | End: 2022-09-27 | Stop reason: HOSPADM

## 2022-09-26 RX ORDER — SPIRONOLACTONE 25 MG/1
12.5 TABLET ORAL EVERY MORNING
Status: DISCONTINUED | OUTPATIENT
Start: 2022-09-27 | End: 2022-09-27 | Stop reason: HOSPADM

## 2022-09-26 RX ADMIN — LEVOTHYROXINE SODIUM 75 MCG: 0.07 TABLET ORAL at 09:17

## 2022-09-26 RX ADMIN — QUETIAPINE FUMARATE 12.5 MG: 25 TABLET ORAL at 21:03

## 2022-09-26 RX ADMIN — MEMANTINE HYDROCHLORIDE 10 MG: 10 TABLET ORAL at 21:03

## 2022-09-26 RX ADMIN — Medication 10 ML: at 09:24

## 2022-09-26 RX ADMIN — GABAPENTIN 100 MG: 100 CAPSULE ORAL at 09:17

## 2022-09-26 RX ADMIN — FUROSEMIDE 20 MG: 20 TABLET ORAL at 09:17

## 2022-09-26 RX ADMIN — SACUBITRIL AND VALSARTAN 1 TABLET: 24; 26 TABLET, FILM COATED ORAL at 21:02

## 2022-09-26 RX ADMIN — Medication 10 MG: at 21:01

## 2022-09-26 RX ADMIN — PROPOFOL 200 MG: 10 INJECTION, EMULSION INTRAVENOUS at 15:32

## 2022-09-26 RX ADMIN — SODIUM CHLORIDE 25 MG: 9 INJECTION, SOLUTION INTRAVENOUS at 19:10

## 2022-09-26 RX ADMIN — MEMANTINE HYDROCHLORIDE 10 MG: 10 TABLET ORAL at 09:18

## 2022-09-26 RX ADMIN — BUSPIRONE HYDROCHLORIDE 10 MG: 10 TABLET ORAL at 09:17

## 2022-09-26 RX ADMIN — METOPROLOL SUCCINATE 12.5 MG: 25 TABLET, EXTENDED RELEASE ORAL at 09:16

## 2022-09-26 RX ADMIN — FUROSEMIDE 20 MG: 20 TABLET ORAL at 17:57

## 2022-09-26 RX ADMIN — GABAPENTIN 100 MG: 100 CAPSULE ORAL at 21:06

## 2022-09-26 RX ADMIN — Medication 10 ML: at 21:06

## 2022-09-26 RX ADMIN — SODIUM CHLORIDE, POTASSIUM CHLORIDE, SODIUM LACTATE AND CALCIUM CHLORIDE: 600; 310; 30; 20 INJECTION, SOLUTION INTRAVENOUS at 15:23

## 2022-09-26 RX ADMIN — SPIRONOLACTONE 25 MG: 25 TABLET ORAL at 09:17

## 2022-09-26 RX ADMIN — DULOXETINE HYDROCHLORIDE 30 MG: 30 CAPSULE, DELAYED RELEASE ORAL at 09:18

## 2022-09-26 RX ADMIN — DONEPEZIL HYDROCHLORIDE 10 MG: 5 TABLET, FILM COATED ORAL at 21:00

## 2022-09-26 RX ADMIN — BUSPIRONE HYDROCHLORIDE 10 MG: 10 TABLET ORAL at 21:03

## 2022-09-26 RX ADMIN — SODIUM CHLORIDE 100 MG: 9 INJECTION, SOLUTION INTRAVENOUS at 22:27

## 2022-09-26 RX ADMIN — PYRIDOXINE HCL TAB 50 MG 50 MG: 50 TAB at 09:17

## 2022-09-26 ASSESSMENT — LIFESTYLE VARIABLES: SMOKING_STATUS: 0

## 2022-09-26 NOTE — PROGRESS NOTES
Internal Medicine Progress Note    [facility]    Merari Costa. Any Prieto, SHAKILAOJennyIJenny Call D.O., CHARLEEN Leavitt D.O. Coleman Roup, MSN, APRN, NP-C  Salena Andrews. Milton Vinson, MSN, APRN-CNP       Primary Care Physician: Fabricio Colón DO   Admitting Physician:  Sebastien Harrison 33, DO  Admission date and time: 9/21/2022  9:18 AM    Room:  [address] [place] [address]  Admitting diagnosis: Acute blood loss anemia [D62]    Patient Name: Luis A Delgadillo  MRN: [id number]    Date of Service: 9/26/2022     Subjective:  [facility] is a [de-identified] y.o. female who was seen and examined today,9/26/2022, at the bedside. [facility] is resting very comfortably during my examination today. She was evaluated in the presence of her daughter. Multiple questions were answered accordingly. She is currently undergoing bowel preparation for colonoscopy this afternoon. Review of System:   Constitutional:   Improving malaise and fatigue. HEENT:   Denies ear pain, sore throat, sinus or eye problems. Cardiovascular:   Denies any chest pain, irregular heartbeats, or palpitations. Respiratory:   Denies shortness of breath, coughing, sputum production, hemoptysis, or wheezing. Gastrointestinal:   No outward GI bleeding. Starting to develop diarrhea in the setting of bowel preparation. Genitourinary:    Denies any urgency, frequency, hematuria. Voiding without difficulty. Extremities:   Denies lower extremity swelling, edema or cyanosis. Neurology:    Denies any headache or focal neurological deficits, admits to generalized weakness and deconditioning. Psch:   Denies being anxious or depressed. Musculoskeletal:    Denies  myalgias, joint complaints or back pain. Integumentary:   Denies any rashes, ulcers, or excoriations. Denies bruising. Hematologic/Lymphatic:  Denies bruising or bleeding.     Physical Exam:  No intake/output data recorded. Intake/Output Summary (Last 24 hours) at 9/26/2022 0845  Last data filed at 9/25/2022 1306  Gross per 24 hour   Intake 760 ml   Output --   Net 760 ml     I/O last 3 completed shifts: In: 760 [P.O.:760]  Out: -   No data found. Vital Signs:   Blood pressure 111/62, pulse 75, temperature 97.9 °F (36.6 °C), temperature source Oral, resp. rate 18, height 5' 8\" (1.727 m), weight 180 lb (81.6 kg), SpO2 96 %, not currently breastfeeding. General appearance:  Alert, responsive, oriented to person, place, and time. Well preserved, alert, no distress. Pleasantly confused in the setting of dementia. Head:  Normocephalic. No masses, lesions or tenderness. Eyes:  PERRLA. EOMI. Sclera clear. Buccal mucosa moist.  ENT:  Ears normal. Mucosa normal.  Neck:    Supple. Trachea midline. No thyromegaly. No JVD. No bruits. Heart:    Rhythm regular. Rate controlled. 1/6 systolic ejection murmur murmurs. Lungs:    Symmetrical. Clear to auscultation bilaterally. No wheezes. No rhonchi. No rales. Abdomen:   Soft. Non-tender. Non-distended. Bowel sounds positive. No organomegaly or masses. No pain on palpation. Extremities:    Peripheral pulses present. No peripheral edema. No ulcers. No cyanosis. No clubbing. Neurologic:    Alert x 3. No focal deficit. Cranial nerves grossly intact. No focal weakness. Psych:   Behavior is normal. Mood appears normal. Speech is not rapid and/or pressured. As stated previously, pleasantly confused with dementia. Musculoskeletal:   Spine ROM normal. Muscular strength intact. Gait not assessed. Integumentary:  No rashes  Skin normal color and texture.   Genitalia/Breast:  Deferred    Medication:  Scheduled Meds:   furosemide  20 mg Oral BID    pantoprazole  40 mg Oral QAM AC    metoprolol succinate  12.5 mg Oral Daily    spironolactone  25 mg Oral QAM    vitamin B-6  50 mg Oral Daily    sodium chloride flush  5-40 mL IntraVENous 2 times per day busPIRone  10 mg Oral BID    donepezil  10 mg Oral Nightly    DULoxetine  30 mg Oral QAM    gabapentin  100 mg Oral TID    levothyroxine  75 mcg Oral QAM    melatonin  10 mg Oral Nightly    memantine  10 mg Oral BID    QUEtiapine  12.5 mg Oral Nightly     Continuous Infusions:   dextrose      sodium chloride      sodium chloride         Objective Data:  Recent Labs     09/24/22  0623 09/24/22  1413 09/24/22  2147 09/25/22  0551 09/26/22  0552   WBC 10.9  --   --  11.3 8.4   RBC 4.03  --   --  4.18 4.12   HGB 8.0*   < > 8.4* 8.5* 8.5*   HCT 28.9*   < > 30.0* 30.4* 30.5*   MCV 71.7*  --   --  72.7* 74.0*   MCH 19.9*  --   --  20.3* 20.6*   MCHC 27.7*  --   --  28.0* 27.9*   RDW 25.6*  --   --  27.8* 28.9*     --   --  332 333   MPV 9.9  --   --  9.9 9.9    < > = values in this interval not displayed. Lab Results   Component Value Date/Time     09/26/2022 05:52 AM    K 3.6 09/26/2022 05:52 AM    K 4.1 09/21/2022 09:55 AM     09/26/2022 05:52 AM    CO2 26 09/26/2022 05:52 AM    ANIONGAP 12 09/26/2022 05:52 AM    GLUCOSE 94 09/26/2022 05:52 AM    BUN 9 09/26/2022 05:52 AM    CREATININE 0.7 09/26/2022 05:52 AM    GFRAA >60 09/26/2022 05:52 AM    LABGLOM >60 09/26/2022 05:52 AM    CALCIUM 9.4 09/26/2022 05:52 AM    BILITOT 0.6 09/26/2022 05:52 AM    ALT 10 09/26/2022 05:52 AM    AST 21 09/26/2022 05:52 AM    ALKPHOS 150 09/26/2022 05:52 AM    PROT 6.2 09/26/2022 05:52 AM    LABALBU 3.4 09/26/2022 05:52 AM     Recent Labs     09/22/22  0621   MG 2.2      Lab Results   Component Value Date/Time    PHOS 4.0 09/22/2022 06:21 AM     No results for input(s): TROPONINI in the last 72 hours.       Assessment:  Multifactorial microcytic anemia with iron deficiency and suspicion for occult GI bleeding  Severe and newly identified cardiomyopathy with echocardiographic findings of regional wall motion abnormalities  Hypothyroidism on replacement therapy  Essential hypertension  Hyperlipidemia  Underlying dementia on Aricept and Namenda  History of tobacco abuse    Plan:   Plans are for colonoscopic evaluation at this point. Hemoglobin remains stable with no outward signs of GI bleeding. Cardiac medications are being maximized and plans are for conservative management of the cardiomyopathy (no plan for catheterization). This has been discussed at length with the patient and her daughter at the bedside. Pending results of the above-mentioned colonoscopy, I anticipate discharging the patient home (back to [place]) tomorrow. She will require additional up titration of medications for the cardiomyopathy and will require repeat echocardiographic evaluation in approximately 3 months. The patient's daughter voiced understanding and agreement. More than 50% of my time was spent at the bedside counseling/coordinating care with the patient and/or family with face to face contact. This time was spent reviewing notes and laboratory data as well as instructing and counseling the patient. Time I spent with the family or surrogate(s) is included only if the patient was incapable of providing the necessary information or participating in medical decisions. I also discussed the differential diagnosis and all of the proposed management plans with the patient and individuals accompanying the patient. I am readily available for any further decision-making and intervention.        Garry Sellers DO, TRINITY.C.O.I.  9/26/2022  8:45 AM

## 2022-09-26 NOTE — ANESTHESIA PRE PROCEDURE
Department of Anesthesiology  Preprocedure Note       Name:  Todd Ricci   Age:  [de-identified] y.o.  :  1940                                          MRN:  [id number]         Date:  2022      Surgeon: Rosalina Jones):  Manas Alejandre DO    Procedure: Procedure(s):  COLONOSCOPY    Medications prior to admission:   Prior to Admission medications    Medication Sig Start Date End Date Taking? Authorizing Provider   busPIRone (BUSPAR) 10 MG tablet Take 10 mg by mouth in the morning and at bedtime   Yes Historical Provider, MD   donepezil (ARICEPT) 10 MG tablet Take 10 mg by mouth nightly   Yes Historical Provider, MD   Calcium Carb-Cholecalciferol (CALCIUM+D3) 600-800 MG-UNIT TABS Take 1 tablet by mouth every morning   Yes Historical Provider, MD   DULoxetine (CYMBALTA) 30 MG extended release capsule Take 30 mg by mouth every morning   Yes Historical Provider, MD   gabapentin (NEURONTIN) 100 MG capsule Take 100 mg by mouth 3 times daily.    Yes Historical Provider, MD   loperamide (IMODIUM) 2 MG capsule Take 2 mg by mouth every 4 hours as needed for Diarrhea Do not exceed 8 mg daily   Yes Historical Provider, MD   loperamide (IMODIUM) 2 MG capsule Take 2 mg by mouth three times a week    Yes Historical Provider, MD   Melatonin 10 MG TABS Take 1 tablet by mouth at bedtime   Yes Historical Provider, MD   guaiFENesin 400 MG tablet Take 400 mg by mouth every 12 hours as needed for Cough (Congestion)   Yes Historical Provider, MD   spironolactone (ALDACTONE) 25 MG tablet Take 25 mg by mouth every morning   Yes Historical Provider, MD   memantine (NAMENDA) 10 MG tablet Take 10 mg by mouth 2 times daily   Yes Historical Provider, MD   simvastatin (ZOCOR) 40 MG tablet Take 40 mg by mouth nightly   Yes Historical Provider, MD   QUEtiapine (SEROQUEL) 12.5 MG TABS Take 12.5 mg by mouth nightly   Yes Historical Provider, MD   levothyroxine (SYNTHROID) 75 MCG tablet Take 75 mcg by mouth every morning   Yes Historical Provider, MD   Multiple Vitamin (MULTI-DAY PO) Take 1 tablet by mouth every morning    Historical Provider, MD   acetaminophen (TYLENOL) 325 MG tablet Take 650 mg by mouth every 6 hours as needed for Pain or Fever    Historical Provider, MD   Probiotic Product (PROBIOTIC DAILY PO) Take 1 capsule by mouth every morning    Historical Provider, MD   aspirin EC 81 MG EC tablet Take 1 tablet by mouth daily.     Rip Mackey DO       Current medications:    Current Facility-Administered Medications   Medication Dose Route Frequency Provider Last Rate Last Admin    [START ON 9/27/2022] metoprolol succinate (TOPROL XL) extended release tablet 25 mg  25 mg Oral Daily MD Benoit Dewittley Older ON 9/27/2022] spironolactone (ALDACTONE) tablet 12.5 mg  12.5 mg Oral QAM Annalisa Mckee MD        sacubitril-valsartan (ENTRESTO) 24-26 MG per tablet 1 tablet  1 tablet Oral BID Annalisa Mckee MD        furosemide (LASIX) tablet 20 mg  20 mg Oral BID Brittany Hope, DO   20 mg at 09/26/22 1647    pantoprazole (PROTONIX) tablet 40 mg  40 mg Oral QAM AC Feliberto Vilchis MD   40 mg at 09/25/22 1943    vitamin B-6 (PYRIDOXINE) tablet 50 mg  50 mg Oral Daily Ricardo Brenner DO   50 mg at 09/26/22 0886    glucose chewable tablet 16 g  4 tablet Oral PRN Lucetta Ngo, DO        dextrose bolus 10% 125 mL  125 mL IntraVENous PRN Lucetta Ngo, DO        Or    dextrose bolus 10% 250 mL  250 mL IntraVENous PRN Lucetta Ngo, DO        glucagon (rDNA) injection 1 mg  1 mg SubCUTAneous PRN Lucetta Ngo, DO        dextrose 10 % infusion   IntraVENous Continuous PRN Lucetta Ngo, DO        sodium chloride flush 0.9 % injection 5-40 mL  5-40 mL IntraVENous 2 times per day Lucetta Ngo, DO   10 mL at 09/26/22 0924    sodium chloride flush 0.9 % injection 5-40 mL  5-40 mL IntraVENous PRN Lucetta Ngo, DO        0.9 % sodium chloride infusion   IntraVENous PRN Lucetta Ngo, DO  polyethylene glycol (GLYCOLAX) packet 17 g  17 g Oral Daily PRN Kaleigh Cintron, DO        acetaminophen (TYLENOL) tablet 650 mg  650 mg Oral Q6H PRN Kaleigh Cintron, DO   650 mg at 09/21/22 1825    Or    acetaminophen (TYLENOL) suppository 650 mg  650 mg Rectal Q6H PRN Kaleigh Cintron, DO        busPIRone (BUSPAR) tablet 10 mg  10 mg Oral BID Kaleigh Cintron, DO   10 mg at 09/26/22 1851    donepezil (ARICEPT) tablet 10 mg  10 mg Oral Nightly Kaleigh Cintron, DO   10 mg at 09/25/22 2140    DULoxetine (CYMBALTA) extended release capsule 30 mg  30 mg Oral QAM Kaleigh Cintron, DO   30 mg at 09/26/22 4005    gabapentin (NEURONTIN) capsule 100 mg  100 mg Oral TID Kaleigh Cintron, DO   100 mg at 09/26/22 0416    levothyroxine (SYNTHROID) tablet 75 mcg  75 mcg Oral QAM Kaleigh Cintron, DO   75 mcg at 09/26/22 9854    melatonin disintegrating tablet 10 mg  10 mg Oral Nightly Kaleigh Cintron, DO   10 mg at 09/25/22 2140    memantine (NAMENDA) tablet 10 mg  10 mg Oral BID Kaleigh Cintron, DO   10 mg at 09/26/22 7496    haloperidol lactate (HALDOL) injection 5 mg  5 mg IntraMUSCular Nightly PRN Kaleigh Cintron, DO        ipratropium-albuterol (DUONEB) nebulizer solution 1 ampule  1 ampule Inhalation Q4H PRN Kaleigh Cintron, DO        QUEtiapine (SEROQUEL) tablet 12.5 mg  12.5 mg Oral Nightly Kaleigh Cintron, DO   12.5 mg at 09/25/22 2140    0.9 % sodium chloride infusion   IntraVENous PRN Kaleigh Cintron, DO           Allergies:  No Known Allergies    Problem List:    Patient Active Problem List   Diagnosis Code    Memory change R41.3    Hyperlipidemia E78.5    Hypertension I10    Neuropathy G62.9    Hypothyroid E03.9    H/O total hysterectomy Z90.710    Calculus of gallbladder without cholecystitis without obstruction K80.20    Left inguinal hernia K40.90    Late onset Alzheimer's disease without behavioral disturbance (HCC) G30.1, F02.80    Acute blood loss anemia D62    Acute decompensated heart failure (HCC) I50.9    Elevated troponin R77.8    Acute hypoxemic respiratory failure (HCC) J96.01       Past Medical History:        Diagnosis Date    Alzheimer disease ([address]     Hyperlipidemia     Hypertension        Past Surgical History:        Procedure Laterality Date    COLONOSCOPY      ENDOSCOPY, COLON, DIAGNOSTIC      HERNIA REPAIR Left 2015    femoral    HYSTERECTOMY (CERVIX STATUS UNKNOWN)      JOINT REPLACEMENT Bilateral     UPPER GASTROINTESTINAL ENDOSCOPY N/A 2022    EGD ESOPHAGOGASTRODUODENOSCOPY performed by Nilo Subramanian DO at [address] History:    Social History     Tobacco Use    Smoking status: Former     Packs/day: 0.50     Years: 31.00     Pack years: 15.50     Types: Cigarettes     Start date: 1961     Quit date: 8/3/1992     Years since quittin.1    Smokeless tobacco: Never   Substance Use Topics    Alcohol use: Not Currently     Alcohol/week: 0.0 standard drinks                                Counseling given: Not Answered      Vital Signs (Current):   Vitals:    22 0748 22 2000 22 0828 22 0916   BP: 112/69 110/60 111/62 116/62   Pulse: 86 90 75 75   Resp: 16 17 18    Temp: 37.2 °C (98.9 °F) 36.8 °C (98.3 °F) 36.6 °C (97.9 °F)    TempSrc: Oral Temporal Oral    SpO2: 94% 99% 96%    Weight:       Height:                                                  BP Readings from Last 3 Encounters:   22 116/62   20 110/68   19 124/70       NPO Status:                                                                                 BMI:   Wt Readings from Last 3 Encounters:   22 180 lb (81.6 kg)   20 123 lb (55.8 kg)   19 130 lb (59 kg)     Body mass index is 27.37 kg/m².     CBC:   Lab Results   Component Value Date/Time    WBC 8.4 2022 05:52 AM    RBC 4.12 2022 05:52 AM    HGB 8.5 2022 05:52 AM    HCT 30.5 2022 05:52 AM    MCV 74.0 2022 05:52 AM    RDW 28.9 09/26/2022 05:52 AM     09/26/2022 05:52 AM       CMP:   Lab Results   Component Value Date/Time     09/26/2022 05:52 AM    K 3.6 09/26/2022 05:52 AM    K 4.1 09/21/2022 09:55 AM     09/26/2022 05:52 AM    CO2 26 09/26/2022 05:52 AM    BUN 9 09/26/2022 05:52 AM    CREATININE 0.7 09/26/2022 05:52 AM    GFRAA >60 09/26/2022 05:52 AM    LABGLOM >60 09/26/2022 05:52 AM    GLUCOSE 94 09/26/2022 05:52 AM    PROT 6.2 09/26/2022 05:52 AM    CALCIUM 9.4 09/26/2022 05:52 AM    BILITOT 0.6 09/26/2022 05:52 AM    ALKPHOS 150 09/26/2022 05:52 AM    AST 21 09/26/2022 05:52 AM    ALT 10 09/26/2022 05:52 AM       POC Tests: No results for input(s): POCGLU, POCNA, POCK, POCCL, POCBUN, POCHEMO, POCHCT in the last 72 hours.     Coags:   Lab Results   Component Value Date/Time    PROTIME 14.6 09/22/2022 06:21 AM    INR 1.3 09/22/2022 06:21 AM       HCG (If Applicable): No results found for: PREGTESTUR, PREGSERUM, HCG, HCGQUANT     ABGs: No results found for: PHART, PO2ART, OXA6MMR, XUE6POC, BEART, K7WKCVJX     Type & Screen (If Applicable):  No results found for: LABABO, LABRH    Drug/Infectious Status (If Applicable):  No results found for: HIV, HEPCAB    COVID-19 Screening (If Applicable):   Lab Results   Component Value Date/Time    COVID19 Not Detected 09/26/2022 10:30 AM           Anesthesia Evaluation  Patient summary reviewed and Nursing notes reviewed no history of anesthetic complications:   Airway: Mallampati: II  TM distance: >3 FB   Neck ROM: full  Mouth opening: > = 3 FB   Dental:    (+) upper dentures      Pulmonary:normal exam  breath sounds clear to auscultation      (-) not a current smoker                           Cardiovascular:    (+) hypertension:, hyperlipidemia      ECG reviewed  Rhythm: regular  Rate: normal  Echocardiogram reviewed         Beta Blocker:  Not on Beta Blocker         Neuro/Psych:   (+) dementia            GI/Hepatic/Renal:   (+) bowel prep,           Endo/Other:    (+) hypothyroidism, blood dyscrasia: anemia:., .                 Abdominal:             Vascular: negative vascular ROS. Other Findings:      EKG 9/21/22  Normal sinus rhythm  Possible Left atrial enlargement  Low voltage QRS  Poor R wave progression    ECHO 9/22/22   Left Ventricle   Top normal left ventricular chamber size. Moderate global hypokinesis. Visually estimated LVEF is 35-40%. Regional variations in wall motion seen   Grade II diastolic dysfunction with elevated LA pressure. Right Ventricle   Normal right ventricle structure and function. Left Atrium   The left atrium is mildly dilated. Right Atrium   Normal right atrial size      Mitral Valve   Mild mitral regurgitation is present. Mitral annular calcification is present. No evidence of hemodynamically significant mitral stenosis. Tricuspid Valve   The tricuspid valve appears structurally normal.   Physiologic and/or trace tricuspid regurgitation. Aortic Valve   Trileaflet aortic valve. Normal leaflet mobility. No aortic stenosis. Trace aortic regurgitation. Pulmonic Valve   The pulmonic valve was not well visualized. Pericardial Effusion   No evidence for hemodynamically significant pericardial effusion. Pleural Effusion   No evidence of pleural effusion. Aorta   Normal aortic root and ascending aorta. Miscellaneous   Inferior Vena Cava, <50% respiratory variation. Dilated Inferior Vena Cava. Unable to estimate PA pressure. Conclusions      Summary   op normal left ventricular chamber size. Moderate global hypokinesis. Visually estimated LVEF is 35-40%. Regional variations in wall motion seen   Grade II diastolic dysfunction with elevated LA pressure. Normal right ventricle structure and function. The left atrium is mildly dilated. Normal right atrial size   Mild mitral regurgitation is present. Unable to estimate PA pressure. Elevated Bi-atrial pressures.    No comparison study available       Anesthesia Plan      MAC     ASA 3       Induction: intravenous. Anesthetic plan and risks discussed with patient and child/children (Spoke with daughter Mely Figueroa regarding anesthesia plan. ). Plan discussed with attending.                     SOPHY Maria - CRNA   9/26/2022

## 2022-09-26 NOTE — OP NOTE
Operative Note      Patient: Samina Link  YOB: 1940  MRN: [id number]    Date of Procedure: 9/26/2022    Pre-Op Diagnosis: Acute blood loss anemia [D62]    Post-Op Diagnosis: Anemia       Procedure(s):  COLONOSCOPY    Surgeon(s):  Prasad Sharp DO    Assistant:   Surgical Assistant: Reji Min RN    Anesthesia: Monitor Anesthesia Care    Estimated Blood Loss (mL): None    Complications: None    Specimens:   * No specimens in log *    Implants:  * No implants in log *      Drains:   [REMOVED] Urinary Catheter 09/22/22 (Removed)   Catheter Indications Need for fluid volume management of the critically ill patient in a critical care setting 09/22/22 1959   Site Assessment No urethral drainage 09/22/22 1959   Urine Color Yellow;Straw 09/23/22 0745   Urine Appearance Clear 09/23/22 0745   Collection Container Standard 09/23/22 0745   Securement Method Leg strap;Securing device (Describe) 09/23/22 0745   Catheter Care Completed Yes 09/22/22 1959   Catheter Best Practices  Drainage tube clipped to bed;Catheter secured to thigh; Tamper seal intact; Bag below bladder;Bag not on floor; Lack of dependent loop in tubing;Drainage bag less than half full 09/23/22 0745   Output (mL) 800 mL 09/23/22 0604       Detailed Description of Procedure:   Colonoscopy note    Indication: Anemia    Consent: Informed consent was obtained from the patient including and not limited to risk of perforation, bleeding, infection, dental breakage, ileus, need for surgery, or worst case death. Sedation  MAC    Estimated Blood Loss -- None    Endoscope was advanced through anus to cecum and terminal ileum. The ileocecal valve and appendiceal orifice were identified and pictures were taken. Preparation is good. Patient tolerated procedure well.     NO FRESH OR OLD BLOOD SEEN ON EXAM    Terminal ileum x5 cm is normal  Cecum is normal   Ascending colon is normal   Transverse colon with mild diverticulosis with no bleeding stigmata  Descending colon with mild diverticulosis with no bleeding stigmata  Sigmoid colon with moderate to severe diverticulosis with no bleeding stigmata  Rectum direct views are normal  Retroflexion in rectum shows normal mucosa and dentate line    IMPRESSION AND PLAN:   1. No fresh or old blood seen on exam.  No source of anemia identified. 2.  Moderate to severe diverticulosis in sigmoid and mild diverticulosis to transverse colon. 3.  Grade 1-2 IH's, EH, mild rectal prolapse    4. Ok to resume diet, ok for aspirin from GI standpoint. Recommend hematology eval.  Will plan for outpt capsule endoscopy. Pt states has had no melena or hematochezia. Our service will follow. Did discuss results with Yolanda Lincoln at [phone]. After d/w daughter I did add a run of IV ferrelicit as well per protocol. 5.  Follow up as outpatient in office, call [phone] to schedule for appointment if needed. Repeat colonoscopy only if alarm symptoms (pain, bleeding, weight loss, diarrhea or sudden onset constipation) occur.        Electronically signed by Gonzales Rivera DO on 9/26/2022 at 3:53 PM

## 2022-09-26 NOTE — PROGRESS NOTES
Physical Therapy    0421/0421-01    Patient unavailable for physical therapy treatment due to prepping for a colonscopy. Konstantin Mtz.  Debi  [facility]  LIC # [id number]

## 2022-09-26 NOTE — CARE COORDINATION
Ss note:9/26/20229:39 AM Neg covid on 9-21-22. RAPID COVID ordered today per facility request.Pt presents from [address]. Pt can return there upon discharge per liaison, dtr in agreement with plan. Per IDR pt for scope today, anticipate discharge tomorrow per Physician. Orders noted for  [facility] for PT/OT and liaviri Singh aware. Dtr will transport pt via car after breakfast and prefers before lunch.  SUSANA White

## 2022-09-26 NOTE — PROGRESS NOTES
INPATIENT CARDIOLOGY FOLLOW-UP    Name: Sav Degroot    Age: [de-identified] y.o. Date of Admission: 9/21/2022  9:18 AM    Date of Service: 9/26/2022    Primary Cardiologist: Karlos Griffith    Chief Complaint: Follow-up for cardiomyopathy, troponin    Interim History:  Colonoscopy planned for later today. Pleasantly confused but has no complaints. Denies chest pain or shortness of breath. Daughter at the bedside.     Review of Systems:   Negative except as described above    Problem List:  Patient Active Problem List   Diagnosis    Memory change    Hyperlipidemia    Hypertension    Neuropathy    Hypothyroid    H/O total hysterectomy    Calculus of gallbladder without cholecystitis without obstruction    Left inguinal hernia    Late onset Alzheimer's disease without behavioral disturbance (HCC)    Acute blood loss anemia    Acute decompensated heart failure (HCC)    Elevated troponin    Acute hypoxemic respiratory failure (HCC)       Current Medications:    Current Facility-Administered Medications:     furosemide (LASIX) tablet 20 mg, 20 mg, Oral, BID, Shayna Treviño DO, 20 mg at 09/26/22 0917    pantoprazole (PROTONIX) tablet 40 mg, 40 mg, Oral, QAM AC, Severa List, MD, 40 mg at 09/25/22 4856    metoprolol succinate (TOPROL XL) extended release tablet 12.5 mg, 12.5 mg, Oral, Daily, Terrence Fry MD, 12.5 mg at 09/26/22 7221    spironolactone (ALDACTONE) tablet 25 mg, 25 mg, Oral, QAM, Ismail U Dawson, DO, 25 mg at 09/26/22 4370    vitamin B-6 (PYRIDOXINE) tablet 50 mg, 50 mg, Oral, Daily, Ricardo Brenner DO, 50 mg at 09/26/22 0917    glucose chewable tablet 16 g, 4 tablet, Oral, PRN, Ismail U Dawson, DO    dextrose bolus 10% 125 mL, 125 mL, IntraVENous, PRN **OR** dextrose bolus 10% 250 mL, 250 mL, IntraVENous, PRN, Ismail U Dawson, DO    glucagon (rDNA) injection 1 mg, 1 mg, SubCUTAneous, PRN, Ismail U Dawson, DO    dextrose 10 % infusion, , IntraVENous, Continuous PRN, Ismail U Dawson, DO    sodium chloride flush 0.9 % injection 5-40 mL, 5-40 mL, IntraVENous, 2 times per day, Ravindra Ventura, , 10 mL at 09/26/22 0924    sodium chloride flush 0.9 % injection 5-40 mL, 5-40 mL, IntraVENous, PRN, Ravindra Ventura, DO    0.9 % sodium chloride infusion, , IntraVENous, PRN, Ismail U Dawson, DO    polyethylene glycol (GLYCOLAX) packet 17 g, 17 g, Oral, Daily PRN, Ravindra Ventura, DO    acetaminophen (TYLENOL) tablet 650 mg, 650 mg, Oral, Q6H PRN, 650 mg at 09/21/22 1825 **OR** acetaminophen (TYLENOL) suppository 650 mg, 650 mg, Rectal, Q6H PRN, Ravindra Ventura, DO    busPIRone (BUSPAR) tablet 10 mg, 10 mg, Oral, BID, Ismail U Dawson, DO, 10 mg at 09/26/22 0917    donepezil (ARICEPT) tablet 10 mg, 10 mg, Oral, Nightly, Ismail U Dawson, DO, 10 mg at 09/25/22 2140    DULoxetine (CYMBALTA) extended release capsule 30 mg, 30 mg, Oral, QAM, Ismail U Dawson, DO, 30 mg at 09/26/22 9595    gabapentin (NEURONTIN) capsule 100 mg, 100 mg, Oral, TID, Ravindra Ventura, DO, 100 mg at 09/26/22 1009    levothyroxine (SYNTHROID) tablet 75 mcg, 75 mcg, Oral, QAM, Ravindra Ventura, DO, 75 mcg at 09/26/22 8363    melatonin disintegrating tablet 10 mg, 10 mg, Oral, Nightly, Ismail U Dawson, DO, 10 mg at 09/25/22 2140    memantine (NAMENDA) tablet 10 mg, 10 mg, Oral, BID, Ismail U Dawson, DO, 10 mg at 09/26/22 5034    haloperidol lactate (HALDOL) injection 5 mg, 5 mg, IntraMUSCular, Nightly PRN, Ravindra Ventura, DO    ipratropium-albuterol (DUONEB) nebulizer solution 1 ampule, 1 ampule, Inhalation, Q4H PRN, Ravindra Ventura, DO    QUEtiapine (SEROQUEL) tablet 12.5 mg, 12.5 mg, Oral, Nightly, Ismail U Dawson, DO, 12.5 mg at 09/25/22 2140    0.9 % sodium chloride infusion, , IntraVENous, PRN, Ravindra Ventura,     Physical Exam:  /62   Pulse 75   Temp 97.9 °F (36.6 °C) (Oral)   Resp 18   Ht 5' 8\" (1.727 m)   Wt 180 lb (81.6 kg)   LMP  (LMP Unknown)   SpO2 96%   BMI 27.37 kg/m²   Wt Readings from Last 3 Encounters:   09/21/22 180 lb (81.6 kg) 01/08/20 123 lb (55.8 kg)   11/18/19 130 lb (59 kg)     Appearance: Elderly female, awake, alert, no acute respiratory distress  Skin: Intact, no rash  Head: Normocephalic, atraumatic  Eyes: EOMI, no conjunctival erythema  ENMT: No pharyngeal erythema, MMM, no rhinorrhea  Neck: Supple, no elevated JVP, no carotid bruits  Lungs: Clear to auscultation bilaterally. No wheezes, rales, or rhonchi. Cardiac: PMI nondisplaced, Regular rhythm with a normal rate, S1 & S2 normal, no murmurs  Abdomen: Soft, nontender, +bowel sounds  Extremities: Moves all extremities x 4, no lower extremity edema  Neurologic: No focal motor deficits apparent, normal mood and affect  Peripheral Pulses: Intact posterior tibial pulses bilaterally    Intake/Output:    Intake/Output Summary (Last 24 hours) at 9/26/2022 1108  Last data filed at 9/25/2022 1306  Gross per 24 hour   Intake 240 ml   Output --   Net 240 ml     No intake/output data recorded. Laboratory Tests:  Recent Labs     09/24/22 0623 09/25/22  0551 09/26/22  0552    138 143   K 3.4* 3.9 3.6    101 105   CO2 25 29 26   BUN 15 18 9   CREATININE 0.8 0.8 0.7   GLUCOSE 88 101* 94   CALCIUM 9.2 9.0 9.4     Lab Results   Component Value Date/Time    MG 2.2 09/22/2022 06:21 AM     Recent Labs     09/24/22 0623 09/25/22  0551 09/26/22  0552   ALKPHOS 139* 143* 150*   ALT 13 12 10   AST 21 24 21   PROT 6.1* 6.5 6.2*   BILITOT 0.7 0.7 0.6   LABALBU 3.1* 3.7 3.4*     Recent Labs     09/24/22  0623 09/24/22  1413 09/24/22  2147 09/25/22  0551 09/26/22  0552   WBC 10.9  --   --  11.3 8.4   RBC 4.03  --   --  4.18 4.12   HGB 8.0*   < > 8.4* 8.5* 8.5*   HCT 28.9*   < > 30.0* 30.4* 30.5*   MCV 71.7*  --   --  72.7* 74.0*   MCH 19.9*  --   --  20.3* 20.6*   MCHC 27.7*  --   --  28.0* 27.9*   RDW 25.6*  --   --  27.8* 28.9*     --   --  332 333   MPV 9.9  --   --  9.9 9.9    < > = values in this interval not displayed.      Lab Results   Component Value Date    TROPONINI <0.01 2019     Lab Results   Component Value Date    INR 1.3 2022    INR 1.3 2022    PROTIME 14.6 (H) 2022    PROTIME 14.9 (H) 2022     Lab Results   Component Value Date    TSH 2.580 2022     Lab Results   Component Value Date    LABA1C 5.7 (H) 2019     No results found for: EAG  Lab Results   Component Value Date    CHOL 135 2019    CHOL 151 2018    CHOL 167 2017     Lab Results   Component Value Date    TRIG 159 (H) 2019    TRIG 122 2018    TRIG 152 (H) 2017     Lab Results   Component Value Date    HDL 67 2019    HDL 62 2018    HDL 59 2017     Lab Results   Component Value Date    LDLCALC 36 2019    LDLCALC 65 2018    LDLCALC 78 2017     Lab Results   Component Value Date    LABVLDL 32 2019    LABVLDL 24 2018    LABVLDL 30 2017     No results found for: CHOLHDLRATIO  No results for input(s): PROBNP in the last 72 hours. Cardiac Tests:    EK2022: Sinus rhythm 90 beats minute. Normal axis and intervals. Nonspecific anterior and inferior T wave inversions. Possible prior inferior infarct. Poor R progression. Telemetry:   Sinus rhythm 70s, brief nonsustained PAT    Chest X-ray:   22    Impression   No acute process     Echocardiogram:   TTE 22   Summary  Top normal left ventricular chamber size. Moderate global hypokinesis. Visually estimated LVEF is 35-40%. Regional variations in wall motion seen   Grade II diastolic dysfunction with elevated LA pressure. Normal right ventricle structure and function. The left atrium is mildly dilated. Normal right atrial size   Mild mitral regurgitation is present. Unable to estimate PA pressure. Elevated Bi-atrial pressures. No comparison study available.     Stress test:      Cardiac catheterization: ----------------------------------------------------------------------------------------------------------------------------------------------------------------  IMPRESSION:  Likely type II MI in setting of severe anemia. hs-cTnT 135 -> 315 -> 66 ng/L  Cardiomyopathy, newly diagnosed. Possibly ischemic EF 35-40%  Acute heart failure with reduced ejection fraction. proBNP 8900 -> 6000. Net -6.6 L  Mild MR  Suspected GI bleed, s/p EGD 9/23/2022 essentially normal  Severe anemia acute on chronic, Hgb 5.0 -> 8.5 post transfusions  Acute hypoxic respiratory failure, resolved  Hypertension  Hyperlipidemia  Hypothyroidism  Dementia  Anxiety    RECOMMENDATIONS:  Stable from cardiac standpoint, troponin has down trended significantly. Remains at elevated risk for procedures given recent MI  Resume aspirin if okay with GI  Consider ischemic evaluation at some point, however given age, lack of chest pain, comorbidities including severe anemia, frailty, dementia and limited CODE STATUS recommend conservative medical management for now  Continue GDMT for cardiomyopathy  Increase metoprolol succinate to 25 mg daily  Add sacubitril/valsartan if blood pressure tolerates  Reduce spironolactone to 12.5 mg daily  Recommend addition of SGLT2 inhibitor, can be assessed outpatient  Consider statin  Further care per primary service and consultants  Okay for discharge from cardiology standpoint if no issues with colonoscopy  Outpatient follow-up with Dr. Karlos Griffith  Will available as needed, please call with questions    Rodney Zhang MD, [facility] Card[facility]    NOTE: This report was transcribed using voice recognition software. Every effort was made to ensure accuracy; however, inadvertent computerized transcription errors may be present.

## 2022-09-26 NOTE — ANESTHESIA POSTPROCEDURE EVALUATION
Department of Anesthesiology  Postprocedure Note    Patient: Blaze Stanton  MRN: [id number]  YOB: 1940  Date of evaluation: 9/26/2022      Procedure Summary     Date: 09/26/22 Room / Location: [address] [place] [address] / [facility] AT [facility] ([facility])    Anesthesia Start: [phone] Anesthesia Stop: 5519    Procedure: COLONOSCOPY Diagnosis:       Acute blood loss anemia      (Acute blood loss anemia [D62])    Surgeons: Rafita Collazo DO Responsible Provider: Michael Arvizu MD    Anesthesia Type: MAC ASA Status: 3          Anesthesia Type: MAC    Capri Phase I:      Capri Phase II: Capri Score: 10      Anesthesia Post Evaluation    Patient location during evaluation: PACU  Patient participation: complete - patient participated  Level of consciousness: awake  Airway patency: patent  Nausea & Vomiting: no nausea and no vomiting  Complications: no  Cardiovascular status: hemodynamically stable  Respiratory status: acceptable  Hydration status: euvolemic

## 2022-09-27 ENCOUNTER — TELEPHONE (OUTPATIENT)
Dept: FAMILY MEDICINE CLINIC | Age: 82
End: 2022-09-27

## 2022-09-27 VITALS
RESPIRATION RATE: 16 BRPM | HEIGHT: 68 IN | TEMPERATURE: 98.1 F | SYSTOLIC BLOOD PRESSURE: 101 MMHG | WEIGHT: 180 LBS | BODY MASS INDEX: 27.28 KG/M2 | DIASTOLIC BLOOD PRESSURE: 53 MMHG | HEART RATE: 87 BPM | OXYGEN SATURATION: 95 %

## 2022-09-27 LAB
ALBUMIN SERPL-MCNC: 3.5 G/DL (ref 3.5–5.2)
ALP BLD-CCNC: 149 U/L (ref 35–104)
ALT SERPL-CCNC: 12 U/L (ref 0–32)
ANION GAP SERPL CALCULATED.3IONS-SCNC: 11 MMOL/L (ref 7–16)
ANISOCYTOSIS: ABNORMAL
AST SERPL-CCNC: 22 U/L (ref 0–31)
BASOPHILS ABSOLUTE: 0.08 E9/L (ref 0–0.2)
BASOPHILS RELATIVE PERCENT: 0.9 % (ref 0–2)
BILIRUB SERPL-MCNC: 0.5 MG/DL (ref 0–1.2)
BUN BLDV-MCNC: 11 MG/DL (ref 6–23)
BURR CELLS: ABNORMAL
CALCIUM SERPL-MCNC: 8.9 MG/DL (ref 8.6–10.2)
CHLORIDE BLD-SCNC: 103 MMOL/L (ref 98–107)
CO2: 26 MMOL/L (ref 22–29)
CREAT SERPL-MCNC: 0.7 MG/DL (ref 0.5–1)
EKG ATRIAL RATE: 67 BPM
EKG P AXIS: 39 DEGREES
EKG P-R INTERVAL: 158 MS
EKG Q-T INTERVAL: 434 MS
EKG QRS DURATION: 76 MS
EKG QTC CALCULATION (BAZETT): 458 MS
EKG R AXIS: 2 DEGREES
EKG T AXIS: 101 DEGREES
EKG VENTRICULAR RATE: 67 BPM
EOSINOPHILS ABSOLUTE: 0.08 E9/L (ref 0.05–0.5)
EOSINOPHILS RELATIVE PERCENT: 0.9 % (ref 0–6)
GFR AFRICAN AMERICAN: >60
GFR NON-AFRICAN AMERICAN: >60 ML/MIN/1.73
GLUCOSE BLD-MCNC: 102 MG/DL (ref 74–99)
HCT VFR BLD CALC: 31 % (ref 34–48)
HEMOGLOBIN: 8.6 G/DL (ref 11.5–15.5)
HYPOCHROMIA: ABNORMAL
LYMPHOCYTES ABSOLUTE: 0.77 E9/L (ref 1.5–4)
LYMPHOCYTES RELATIVE PERCENT: 8.8 % (ref 20–42)
MCH RBC QN AUTO: 20.5 PG (ref 26–35)
MCHC RBC AUTO-ENTMCNC: 27.7 % (ref 32–34.5)
MCV RBC AUTO: 73.8 FL (ref 80–99.9)
MONOCYTES ABSOLUTE: 0.43 E9/L (ref 0.1–0.95)
MONOCYTES RELATIVE PERCENT: 5.3 % (ref 2–12)
NEUTROPHILS ABSOLUTE: 7.22 E9/L (ref 1.8–7.3)
NEUTROPHILS RELATIVE PERCENT: 84.1 % (ref 43–80)
OVALOCYTES: ABNORMAL
PDW BLD-RTO: 29.7 FL (ref 11.5–15)
PLATELET # BLD: 333 E9/L (ref 130–450)
PMV BLD AUTO: 9.8 FL (ref 7–12)
POIKILOCYTES: ABNORMAL
POLYCHROMASIA: ABNORMAL
POTASSIUM SERPL-SCNC: 3.4 MMOL/L (ref 3.5–5)
RBC # BLD: 4.2 E12/L (ref 3.5–5.5)
SCHISTOCYTES: ABNORMAL
SODIUM BLD-SCNC: 140 MMOL/L (ref 132–146)
TEAR DROP CELLS: ABNORMAL
TOTAL PROTEIN: 6.3 G/DL (ref 6.4–8.3)
WBC # BLD: 8.6 E9/L (ref 4.5–11.5)

## 2022-09-27 PROCEDURE — 97530 THERAPEUTIC ACTIVITIES: CPT

## 2022-09-27 PROCEDURE — 6370000000 HC RX 637 (ALT 250 FOR IP): Performed by: INTERNAL MEDICINE

## 2022-09-27 PROCEDURE — 85025 COMPLETE CBC W/AUTO DIFF WBC: CPT

## 2022-09-27 PROCEDURE — 6370000000 HC RX 637 (ALT 250 FOR IP): Performed by: HOSPITALIST

## 2022-09-27 PROCEDURE — 2580000003 HC RX 258: Performed by: INTERNAL MEDICINE

## 2022-09-27 PROCEDURE — 97116 GAIT TRAINING THERAPY: CPT

## 2022-09-27 PROCEDURE — 36415 COLL VENOUS BLD VENIPUNCTURE: CPT

## 2022-09-27 PROCEDURE — 80053 COMPREHEN METABOLIC PANEL: CPT

## 2022-09-27 RX ORDER — SPIRONOLACTONE 25 MG/1
12.5 TABLET ORAL EVERY MORNING
Qty: 30 TABLET | Refills: 3 | Status: SHIPPED | OUTPATIENT
Start: 2022-09-27 | End: 2022-10-26 | Stop reason: SDUPTHER

## 2022-09-27 RX ORDER — METOPROLOL SUCCINATE 25 MG/1
25 TABLET, EXTENDED RELEASE ORAL DAILY
Qty: 30 TABLET | Refills: 3 | Status: SHIPPED | OUTPATIENT
Start: 2022-09-27

## 2022-09-27 RX ORDER — FUROSEMIDE 20 MG/1
20 TABLET ORAL 2 TIMES DAILY
Qty: 60 TABLET | Refills: 3 | Status: SHIPPED | OUTPATIENT
Start: 2022-09-27 | End: 2022-10-26 | Stop reason: SDUPTHER

## 2022-09-27 RX ORDER — PYRIDOXINE HCL (VITAMIN B6) 50 MG
50 TABLET ORAL DAILY
Qty: 30 TABLET | Refills: 3 | Status: SHIPPED | OUTPATIENT
Start: 2022-09-27

## 2022-09-27 RX ORDER — POTASSIUM CHLORIDE 20 MEQ/1
20 TABLET, EXTENDED RELEASE ORAL DAILY
Qty: 30 TABLET | Refills: 0 | Status: SHIPPED | OUTPATIENT
Start: 2022-09-27 | End: 2022-10-26 | Stop reason: ALTCHOICE

## 2022-09-27 RX ADMIN — ASPIRIN 81 MG: 81 TABLET, COATED ORAL at 09:49

## 2022-09-27 RX ADMIN — Medication 10 ML: at 09:49

## 2022-09-27 RX ADMIN — PANTOPRAZOLE SODIUM 40 MG: 40 TABLET, DELAYED RELEASE ORAL at 05:11

## 2022-09-27 RX ADMIN — METOPROLOL SUCCINATE 25 MG: 25 TABLET, EXTENDED RELEASE ORAL at 09:50

## 2022-09-27 RX ADMIN — PYRIDOXINE HCL TAB 50 MG 50 MG: 50 TAB at 09:50

## 2022-09-27 RX ADMIN — DULOXETINE HYDROCHLORIDE 30 MG: 30 CAPSULE, DELAYED RELEASE ORAL at 09:49

## 2022-09-27 RX ADMIN — SACUBITRIL AND VALSARTAN 1 TABLET: 24; 26 TABLET, FILM COATED ORAL at 09:50

## 2022-09-27 RX ADMIN — LEVOTHYROXINE SODIUM 75 MCG: 0.07 TABLET ORAL at 09:52

## 2022-09-27 RX ADMIN — GABAPENTIN 100 MG: 100 CAPSULE ORAL at 09:49

## 2022-09-27 RX ADMIN — FUROSEMIDE 20 MG: 20 TABLET ORAL at 09:49

## 2022-09-27 RX ADMIN — SPIRONOLACTONE 12.5 MG: 25 TABLET ORAL at 09:49

## 2022-09-27 RX ADMIN — MEMANTINE HYDROCHLORIDE 10 MG: 10 TABLET ORAL at 09:50

## 2022-09-27 RX ADMIN — BUSPIRONE HYDROCHLORIDE 10 MG: 10 TABLET ORAL at 09:49

## 2022-09-27 NOTE — PROGRESS NOTES
Called to make follow up appointment  with PCP was unable to obtain. Electronically signed by Asha Emery on 9/27/22 at 10:27 AM EDT

## 2022-09-27 NOTE — PLAN OF CARE
-Reviewed chart for possible HF, at time of review patient does not have history of nor has been admitted for HF  -Advised to follow up with PCP post hospital discharge.      Eyal Andersen MSN, RN  Heart Failure Navigator

## 2022-09-27 NOTE — TELEPHONE ENCOUNTER
----- Message from María Keating sent at 9/27/2022 10:34 AM EDT -----  Subject: Message to Provider    QUESTIONS  Information for Provider? Hospital was calling to schedule a follow up  ---------------------------------------------------------------------------  --------------  [address]  [phone]; Do not leave any message, patient will call back for answer  ---------------------------------------------------------------------------  --------------  SCRIPT ANSWERS  Relationship to Patient? Third Party  Third Party Type?  Hospital?   Representative Name? Emir Helm

## 2022-09-27 NOTE — PROGRESS NOTES
CLINICAL PHARMACY NOTE: MEDS TO BEDS    Total # of Prescriptions Filled: 3   The following medications were delivered to the patient:  Metoprolol succinate 25 mg  Vitamin B 6 50 mg  Furosemide 20 mg    Additional Documentation:  Spironolactone refill too soon script given back to pt.

## 2022-09-27 NOTE — DISCHARGE SUMMARY
GLUCOSE 102 (H) 09/27/2022    ALT 12 09/27/2022    AST 22 09/27/2022    INR 1.3 09/22/2022     Lab Results   Component Value Date    INR 1.3 09/22/2022    INR 1.3 09/21/2022    PROTIME 14.6 (H) 09/22/2022    PROTIME 14.9 (H) 09/21/2022      Lab Results   Component Value Date    TSH 2.580 09/22/2022     Lab Results   Component Value Date    TRIG 159 (H) 11/18/2019    TRIG 122 08/03/2018    TRIG 152 (H) 11/29/2017     Lab Results   Component Value Date    HDL 67 11/18/2019    HDL 62 08/03/2018    HDL 59 11/29/2017     Lab Results   Component Value Date    LDLCALC 36 11/18/2019    LDLCALC 65 08/03/2018    LDLCALC 78 11/29/2017     Lab Results   Component Value Date    LABA1C 5.7 (H) 11/18/2019       IMAGING:  Echo Complete    Result Date: 9/22/2022  Transthoracic Echocardiography Report (TTE)  Demographics   Patient Name      Brenda Upton      Gender              Female                    [address] [place] [address]      Room Number         [facility]  Number   Account #         [de-identified]     Procedure Date      09/22/2022   Corporate ID                    Ordering Physician  Jose Elias Manzanares DO   Accession Number  [id number]    Referring Physician   Date of Birth     1940    [address]                                                      [facility]   Age               [de-identified] year(s)    Interpreting        Jose Elias Manzanares DO                                  Physician           Josue Colón MD                                   Any Other  Procedure Type of Study   TTE procedure  Procedure Date Date: 09/22/2022 Start: 08:51 AM Study Location: Echo Lab Technical Quality: Adequate visualization Indications:Shortness of breath. Patient Status: Routine Contrast Medium: Definity. Height: 68 inches Weight: 180 pounds BSA: 1.95 m^2 BMI: 27.37 kg/m^2 Rhythm: Within normal limits HR: 93 bpm BP: 107/68 mmHg  Findings   Left Ventricle  Top normal left ventricular chamber size. Moderate global hypokinesis.   Visually estimated LVEF is 35-40%. Regional variations in wall motion seen  Grade II diastolic dysfunction with elevated LA pressure. Right Ventricle  Normal right ventricle structure and function. Left Atrium  The left atrium is mildly dilated. Right Atrium  Normal right atrial size   Mitral Valve  Mild mitral regurgitation is present. Mitral annular calcification is present. No evidence of hemodynamically significant mitral stenosis. Tricuspid Valve  The tricuspid valve appears structurally normal.  Physiologic and/or trace tricuspid regurgitation. Aortic Valve  Trileaflet aortic valve. Normal leaflet mobility. No aortic stenosis. Trace aortic regurgitation. Pulmonic Valve  The pulmonic valve was not well visualized. Pericardial Effusion  No evidence for hemodynamically significant pericardial effusion. Pleural Effusion  No evidence of pleural effusion. Aorta  Normal aortic root and ascending aorta. Miscellaneous  Inferior Vena Cava, <50% respiratory variation. Dilated Inferior Vena Cava. Unable to estimate PA pressure. Conclusions   Summary  op normal left ventricular chamber size. Moderate global hypokinesis. Visually estimated LVEF is 35-40%. Regional variations in wall motion seen  Grade II diastolic dysfunction with elevated LA pressure. Normal right ventricle structure and function. The left atrium is mildly dilated. Normal right atrial size  Mild mitral regurgitation is present. Unable to estimate PA pressure. Elevated Bi-atrial pressures. No comparison study available.    Signature   ----------------------------------------------------------------  Electronically signed by Hans Fountain MD(Interpreting  physician) on 09/22/2022 04:41 PM  ----------------------------------------------------------------  M-Mode/2D Measurements & Calculations   LV Diastolic    LV Systolic Dimension: 4.6   AV Cusp Separation: 2 cmLA  Dimension: 5.6  cm                           Dimension: 5.3 cmAO Root  cm              LV Volume Diastolic: 053.4   Dimension: 3.3 cm  LV FS:17.9 %    ml  LV PW           LV Volume Systolic: 70.1 ml  Diastolic: 1 cm LV EDV/LV EDV Index: 154.8  Septum          ml/79 ml/m^2LV ESV/LV ESV    RV Diastolic Dimension: 2.5  Diastolic: 1 cm Index: 97.4 ml/49ml/ m^2     cm  CO: 5.28 l/min  EF Calculated: 38.2 %  CI: 2.71        LV Mass Index: 113 l/min*m^2  l/m*m^2         LV Length: 7.2 cm            LA volume/Index: 71.5 ml  LV Mass: 219.72  g               LVOT: 2.1 cm  Doppler Measurements & Calculations   MV Peak E-Wave: 1    AV Peak Velocity:     LVOT Peak Velocity: 0.93 m/s  m/s                  1.21 m/s              LVOT Mean Velocity: 0.62 m/s  MV Peak A-Wave: 1.27 AV Peak Gradient:     LVOT Peak Gradient: 3.5  m/s                  5.83 mmHg             mmHgLVOT Mean Gradient: 1.8  MV E/A Ratio: 0.79   AV Mean Velocity: 0.9 mmHg  MV Peak Gradient:    m/s  8.7 mmHg             AV Mean Gradient: 3.6  MV Mean Gradient:    mmHg  4.6 mmHg             AV VTI: 21.8 cm       TR Velocity:2.38 m/s  MV Mean Velocity:    AV Area               TR Gradient:22.73 mmHg  1.04 m/s             (Continuity):2.6 cm^2 PV Peak Velocity: 0.81 m/s  MV Deceleration                            PV Peak Gradient: 2.63 mmHg  Time: 207.2 msec     LVOT VTI: 16.4 cm     PV Mean Velocity: 0.64 m/s  MV P1/2t: 60.7 msec  IVRT: 103.8 msec      PV Mean Gradient: 1.7 mmHg  MVA by PHT:3.62 cm^2  MV Area  (continuity): 2.1  cm^2  [id number]    XR CHEST PORTABLE    Result Date: 9/21/2022  EXAMINATION: ONE XRAY VIEW OF THE CHEST 9/21/2022 9:36 am COMPARISON: None. HISTORY: ORDERING SYSTEM PROVIDED HISTORY: chest tight/ sob TECHNOLOGIST PROVIDED HISTORY: Reason for exam:->chest tight/ sob FINDINGS: Heart size is normal.  There is diffuse interstitial and parenchymal scarring.   There are no acute infiltrates or effusions. No acute process         HOSPITAL COURSE:   Conchita spent an extended period of time hospitalized and this will be a brief synopsis of the events that occurred during the hospitalization. She was found to be suffering from multifactorial anemia and was found to be iron deficient. She was evaluated by the GI team and underwent upper and lower endoscopy. She was also found to have a severe newly identified cardiomyopathy with echocardiographic findings of regional wall motion abnormalities. The cardiovascular team provided consultation and cardiac medications were maximized to a point which blood pressure can tolerate. Considerations for cardiac catheterization were undertaken but it was determined that a conservative treatment strategy would be appropriate in her current condition. Her daughter was updated on a regular basis. Her overall condition improved. Hemoglobin stabilized. She began tolerating a diet and became ambulatory. She was deemed acceptable for return to the assisted living facility. BRIEF PHYSICAL EXAMINATION AND LABORATORIES ON DAY OF DISCHARGE:  VITALS:  /67   Pulse 90   Temp 98.5 °F (36.9 °C) (Temporal)   Resp 16   Ht 5' 8\" (1.727 m)   Wt 180 lb (81.6 kg)   LMP  (LMP Unknown)   SpO2 95%   BMI 27.37 kg/m²     HEENT:  PERRLA. EOMI. Sclera clear. Buccal mucosa moist.    Neck:  Supple. Trachea midline. No thyromegaly. No JVD. No bruits. Heart:  Rhythm regular, rate controlled. No murmurs. Lungs:  Symmetrical. Clear to auscultation bilaterally. No wheezes. No rhonchi. No rales. Abdomen: Soft. Non-tender. Non-distended. Bowel sounds positive. No organomegaly or masses. No pain on palpation    Extremities:  Peripheral pulses present. No peripheral edema. No ulcers. Neurologic:  Alert x 3. No focal deficit. Cranial nerves grossly intact. Skin:  No petechia. No hemorrhage. No wounds.       DISPOSITION:  The patient's condition is good.  At this time the patient is without objective evidence of an acute process requiring continuing hospitalization or inpatient management. They are stable for discharge with outpatient follow-up. I have spoken with the patient and discussed the results of the current hospitalization, in addition to providing specific details for the plan of care and counseling regarding the diagnosis and prognosis. The plan has been discussed in detail and they are aware of the specific conditions for emergent return, as well as the importance of follow-up. Their questions are answered at this time and they are agreeable with the plan for discharge to assisted living. DISCHARGE MEDICATIONS:   Current Discharge Medication List             Details   furosemide (LASIX) 20 MG tablet Take 1 tablet by mouth in the morning and 1 tablet in the evening. Qty: 60 tablet, Refills: 3      metoprolol succinate (TOPROL XL) 25 MG extended release tablet Take 1 tablet by mouth daily  Qty: 30 tablet, Refills: 3      sacubitril-valsartan (ENTRESTO) 24-26 MG per tablet Take 1 tablet by mouth 2 times daily  Qty: 60 tablet, Refills: 0      vitamin B-6 (B-6) 50 MG tablet Take 1 tablet by mouth daily  Qty: 30 tablet, Refills: 3                Details   spironolactone (ALDACTONE) 25 MG tablet Take 0.5 tablets by mouth every morning  Qty: 30 tablet, Refills: 3                Details   busPIRone (BUSPAR) 10 MG tablet Take 10 mg by mouth in the morning and at bedtime      donepezil (ARICEPT) 10 MG tablet Take 10 mg by mouth nightly      Calcium Carb-Cholecalciferol (CALCIUM+D3) 600-800 MG-UNIT TABS Take 1 tablet by mouth every morning      DULoxetine (CYMBALTA) 30 MG extended release capsule Take 30 mg by mouth every morning      gabapentin (NEURONTIN) 100 MG capsule Take 100 mg by mouth 3 times daily.       !! loperamide (IMODIUM) 2 MG capsule Take 2 mg by mouth every 4 hours as needed for Diarrhea Do not exceed 8 mg daily      !! loperamide

## 2022-09-27 NOTE — CARE COORDINATION
Ss note: 9/27/202210:49 AM Discharge order noted. Neg rapid covid 9-26-22. Plan is to return to [facility] today. [facility] order noted for facility to arrange at discharge, liaison aware as they have their own therapy at the facility. Per IDR pt will need Entresto at discharge. [facility] to transport via car will need notified when  discharge paperwork is completed. Facility liaison aware of discharge, nursing aware as well.  SUSANA Nichole

## 2022-09-27 NOTE — PROGRESS NOTES
Physical Therapy Treatment Note/Plan of Care    Room #:  2354/0919-84  Patient Name: Sosa Killian  YOB: 1940  MRN: [id number]    Date of Service: 9/27/2022     Tentative placement recommendation: [address] with PT  Equipment recommendation: To be determined      Evaluating Physical Therapist: Alverto Aranda, PT #[id number]      Specific Provider Orders/Date/Referring Provider :  09/22/22 0830    PT eval and treat  Start:  09/22/22 0830,   End:  09/22/22 0830,   ONE TIME,   Standing Count:  1 Occurrences,   R         Suzi Brenner DO     Admitting Diagnosis:   Acute blood loss anemia [D62]     chest tightness and shortness of breath  Surgery: none      Patient Active Problem List   Diagnosis    Memory change    Hyperlipidemia    Hypertension    Neuropathy    Hypothyroid    H/O total hysterectomy    Calculus of gallbladder without cholecystitis without obstruction    Left inguinal hernia    Late onset Alzheimer's disease without behavioral disturbance (HCC)    Acute blood loss anemia    Acute decompensated heart failure (HCC)    Elevated troponin    Acute hypoxemic respiratory failure (HCC)        ASSESSMENT of Current Deficits Patient exhibits decreased strength, balance, and endurance impairing functional mobility, transfers, gait , gait distance, and tolerance to activity Patient improved with tolerance to activity today. Patient ambulated with hand held assist for safety and balance and tolerates seated exercises. Patient demonstrates some confused and unable to gather thoughts at times. Patient requires continued skilled physical therapy to address concerns listed above for increased safety and function at discharge.          PHYSICAL THERAPY  PLAN OF CARE       Physical therapy plan of care is established based on physician order,  patient diagnosis and clinical assessment    Current Treatment Recommendations:    -Bed Mobility: Lower extremity exercises   -Sitting Balance: Incorporate reaching activities to activate trunk muscles   -Standing Balance: Perform strengthening exercises in standing to promote motor control with or without upper extremity support   -Transfers: Provide instruction on proper hand and foot position for adequate transfer of weight onto lower extremities and use of gait device if needed and Cues for hand placement, technique and safety. Provide stabilization to prevent fall   -Gait: Gait training, Standing activities to improve: base of support, weight shift, weight bearing , and Pregait training to emphasize: Upright, Safety, and se of assistive device as needed   -Endurance: Utilize Supervised activities to increase level of endurance to allow for safe functional mobility including transfers and gait     PT long term treatment goals are located in below grid    Patient and or family understand(s) diagnosis, prognosis, and plan of care. Frequency of treatments: Patient will be seen  daily. Prior Level of Function: Patient ambulated independently    Rehab Potential: good - for baseline    Past medical history:   Past Medical History:   Diagnosis Date    Alzheimer disease ([address] [address] [address]     Hyperlipidemia     Hypertension      Past Surgical History:   Procedure Laterality Date    COLONOSCOPY      COLONOSCOPY N/A 9/26/2022    COLONOSCOPY performed by Isauro Otero DO at [facility], [place], [address] Left 12/1/2015    femoral    HYSTERECTOMY (CERVIX STATUS UNKNOWN)      JOINT REPLACEMENT Bilateral     UPPER GASTROINTESTINAL ENDOSCOPY N/A 9/23/2022    EGD ESOPHAGOGASTRODUODENOSCOPY performed by Last Mahoney DO at [facility]:    Precautions:  Up with assistance, falls and alarm ,  Alzheimer's     Social history: Patient lives in an assisted living facility   Equipment owned: None,       [id number] [facility]  Mobility Inpatient   How much difficulty turning over in bed?: A Little  How much difficulty sitting down on / standing up from a chair with arms?: A Little  How much difficulty moving from lying on back to sitting on side of bed?: A Little  How much help from another person moving to and from a bed to a chair?: A Little  How much help from another person needed to walk in hospital room?: A Little  How much help from another person for climbing 3-5 steps with a railing?: A Lot  AM-PAC Inpatient Mobility Raw Score : 17  AM-PAC Inpatient T-Scale Score : 42.13  Mobility Inpatient CMS 0-100% Score: 50.57  Mobility Inpatient CMS G-Code Modifier : CK    Nursing cleared patient for PT treatment. OBJECTIVE;   Initial Evaluation  Date: 9/22/2022 Treatment Date:  9/27/2022     Short Term/ Long Term   Goals   Was pt agreeable to Eval/treatment? Yes yes To be met in 3 days   Pain level   0/10    None reported    Bed Mobility    Rolling: Not assessed     Supine to sit: Minimal assist of 1    Sit to supine: Not assessed     Scooting: Minimal assist of 1   Rolling: Supervision    Supine to sit: Supervision    Sit to supine: Not assessed patient in chair   Scooting: Supervision     Rolling: Independent    Supine to sit:  Independent    Sit to supine: Independent    Scooting: Independent     Transfers Sit to stand: Minimal assist of 1 Cues for hand placement and safety  Sit to stand: Minimal progressing to supervision    Sit to stand: Independent     Ambulation     50 feet using  hand held assist with Minimal assist of 1   for balance and cues for safety and pacing 2x90 feet, 50 feet using  hand held assist with Minimal assist of 1   cues for upright posture, safety, and pacing     100 feet using  least restrictive device versus no device with Independent    Stair negotiation: ascended and descended   Not assessed          ROM Within functional limits    Increase range of motion 10% of affected joints    Strength BUE:  refer to OT eval  RLE:  3+/5  LLE:  3+/5  Increase strength in affected mm groups by 1/3 grade Balance Sitting EOB:  good -  Dynamic Standing:  fair   Sitting EOB: good   Dynamic Standing: fair    Sitting EOB:  good    Dynamic Standing: good       Patient is Alert & Oriented x person and time and follows directions    Sensation:  Patient  denies numbness/tingling   Edema:  no   Endurance: fair  +    Vitals: room air   Blood Pressure at rest  Blood Pressure during session    Heart Rate at rest  Heart Rate during session    SPO2 at rest %  SPO2 during session 88-97%     Patient education  Patient educated on role of Physical Therapy, risks of immobility, safety and plan of care, energy conservation,  importance of mobility while in hospital , purse lip breathing, safety , and seated exercises      Patient response to education:   Pt verbalized understanding Pt demonstrated skill Pt requires further education in this area   Yes Partial Yes      Treatment:  Patient practiced and was instructed/facilitated in the following treatment: Patient assisted to edge of bed,  Sat edge of bed 10 minutes with Supervision  to increase dynamic sitting balance and activity tolerance. Performed seated exercises. Patient amb in hallway with seated rest break and back to bedside to perform standing exercises. Patient rested in bedside chair. Therapeutic Exercises:  ankle pumps, long arc quad, seated marching, marching, and standing hamstring curls  x 15 reps. At end of session, patient in chair with     call light and phone within reach,  all lines and tubes intact, nursing notified. Patient would benefit from continued skilled Physical Therapy to improve functional independence and quality of life.          Patient's/ family goals   Return to [address]    Time in  859  Time out  930    Total Treatment Time  31 minutes      CPT codes:  Therapeutic activities (99144)   20 minutes  1 unit(s)  Gait Training (39725) 11 minutes 1 unit(s)    Maria Eugenia May, \[facility]\""   #[id number]

## 2022-09-27 NOTE — DISCHARGE INSTRUCTIONS
Your information:  Name: Chito Steve  : 1940    Your instructions:    Discharged to [address]. Please make and keep any follow up appointments. If you have increased shortness of breath, increased cough or sputum production, have increased weakness, become dizzy, fall or pass out, have nausea or vomiting, fever or chills, please call Dr. Serena Childs. What to do after you leave the hospital:    Recommended diet: regular diet    Recommended activity: activity as tolerated        The following personal items were collected during your admission and were returned to you:    Belongings  Dental Appliances: Uppers, Partials, At bedside  Vision - Corrective Lenses: Eyeglasses, At home  Hearing Aid: None  Clothing: Pants, Shirt, Socks  Jewelry: Bracelet, Ring, Earrings (2 each)  Body Piercings Removed: N/A  Electronic Devices: None  Weapons (Notify Protective Services/Security): None  Other Valuables: At home  Home Medications: None  Valuables Given To: Patient  Provide Name(s) of Who Valuable(s) Were Given To: patient  Responsible person(s) in the waiting room: larissa  Patient approves for provider to speak to responsible person post operatively: Yes    Information obtained by:  By signing below, I understand that if any problems occur once I leave the hospital I am to contact Dr. Serena Childs. I understand and acknowledge receipt of the instructions indicated above.

## 2022-09-27 NOTE — TELEPHONE ENCOUNTER
Attempted to contact pt, no answer, left detailed vm     Electronically signed by Kathe Dunbar on 9/27/22 at 2:52 PM EDT

## 2022-09-28 ENCOUNTER — TELEPHONE (OUTPATIENT)
Dept: FAMILY MEDICINE CLINIC | Age: 82
End: 2022-09-28

## 2022-09-28 NOTE — TELEPHONE ENCOUNTER
[facility] Trans[facility] Initial Follow Up Call    Outreach made within 2 business days of discharge: Yes    Patient: Bernard Hammond Patient : 1940   MRN: <[id number]>  Reason for Admission: Acute blood loss   Discharge Date: 22       Spoke with: patient's daughter     Discharge department/facility: [facility]     TCM Interactive Patient Contact:  Was patient able to fill all prescriptions: Yes  Is patient taking all medications as directed in the discharge summary? Yes  Does patient understand their discharge instructions: Yes  Does patient have questions or concerns that need addressed prior to 7-14 day follow up office visit: no    Patient's daughter states she lo longer sees Dr. Olga Fan as PCP. Scheduled appointment with PCP within 7-14 days    Follow Up  No future appointments.     Tiffany Lin

## 2022-10-22 PROBLEM — R77.8 ELEVATED TROPONIN: Status: RESOLVED | Noted: 2022-09-22 | Resolved: 2022-10-22

## 2022-10-22 PROBLEM — R79.89 ELEVATED TROPONIN: Status: RESOLVED | Noted: 2022-09-22 | Resolved: 2022-10-22

## 2022-10-26 ENCOUNTER — OFFICE VISIT (OUTPATIENT)
Dept: CARDIOLOGY CLINIC | Age: 82
End: 2022-10-26
Payer: COMMERCIAL

## 2022-10-26 VITALS
OXYGEN SATURATION: 95 % | BODY MASS INDEX: 26.19 KG/M2 | WEIGHT: 172.8 LBS | HEIGHT: 68 IN | DIASTOLIC BLOOD PRESSURE: 62 MMHG | HEART RATE: 78 BPM | RESPIRATION RATE: 16 BRPM | SYSTOLIC BLOOD PRESSURE: 112 MMHG

## 2022-10-26 DIAGNOSIS — I50.20 HFREF (HEART FAILURE WITH REDUCED EJECTION FRACTION) (HCC): Primary | ICD-10-CM

## 2022-10-26 DIAGNOSIS — I50.9 CONGESTIVE HEART FAILURE, UNSPECIFIED HF CHRONICITY, UNSPECIFIED HEART FAILURE TYPE (HCC): ICD-10-CM

## 2022-10-26 PROCEDURE — G8400 PT W/DXA NO RESULTS DOC: HCPCS | Performed by: NURSE PRACTITIONER

## 2022-10-26 PROCEDURE — 99214 OFFICE O/P EST MOD 30 MIN: CPT | Performed by: NURSE PRACTITIONER

## 2022-10-26 PROCEDURE — G8427 DOCREV CUR MEDS BY ELIG CLIN: HCPCS | Performed by: NURSE PRACTITIONER

## 2022-10-26 PROCEDURE — 1124F ACP DISCUSS-NO DSCNMKR DOCD: CPT | Performed by: NURSE PRACTITIONER

## 2022-10-26 PROCEDURE — 3078F DIAST BP <80 MM HG: CPT | Performed by: NURSE PRACTITIONER

## 2022-10-26 PROCEDURE — 93000 ELECTROCARDIOGRAM COMPLETE: CPT | Performed by: INTERNAL MEDICINE

## 2022-10-26 PROCEDURE — G8484 FLU IMMUNIZE NO ADMIN: HCPCS | Performed by: NURSE PRACTITIONER

## 2022-10-26 PROCEDURE — G8417 CALC BMI ABV UP PARAM F/U: HCPCS | Performed by: NURSE PRACTITIONER

## 2022-10-26 PROCEDURE — 1111F DSCHRG MED/CURRENT MED MERGE: CPT | Performed by: NURSE PRACTITIONER

## 2022-10-26 PROCEDURE — 3074F SYST BP LT 130 MM HG: CPT | Performed by: NURSE PRACTITIONER

## 2022-10-26 PROCEDURE — 1036F TOBACCO NON-USER: CPT | Performed by: NURSE PRACTITIONER

## 2022-10-26 PROCEDURE — 1090F PRES/ABSN URINE INCON ASSESS: CPT | Performed by: NURSE PRACTITIONER

## 2022-10-26 RX ORDER — SPIRONOLACTONE 25 MG/1
12.5 TABLET ORAL EVERY MORNING
Qty: 30 TABLET | Refills: 3 | Status: SHIPPED | OUTPATIENT
Start: 2022-10-26

## 2022-10-26 RX ORDER — FUROSEMIDE 40 MG/1
40 TABLET ORAL DAILY
Qty: 90 TABLET | Refills: 4 | Status: SHIPPED | OUTPATIENT
Start: 2022-10-26

## 2022-10-26 RX ORDER — POLYETHYLENE GLYCOL 3350 17 G/17G
17 POWDER, FOR SOLUTION ORAL DAILY PRN
COMMUNITY

## 2022-10-26 RX ORDER — PANTOPRAZOLE SODIUM 40 MG/1
40 TABLET, DELAYED RELEASE ORAL DAILY
COMMUNITY

## 2022-10-26 NOTE — PATIENT INSTRUCTIONS
Change lasix from 20 mg twice daily to 40 mg once daily     2. Stop potassium supplement     3. Please ensure that she is taking spironolactone 12.5 mg daily     4. Start Jardiance 10 mg daily     5. Follow up BMP/BNP in 1 week and fax to cardiology office    6. Weight patient daily     7. Apply compression stockings to lower leg, on in AM and off in PM    8. Follow up with Dr. Leo Claros in 2-3 months     9. Weigh yourself daily    -Stay Hydrated, 64 oz     -Diet should sodium restricted to 2 grams    -Again watch your daily weight trends and if you gain water weight please follow below instructions.    -If you gain 3-5 pounds in 2-3 days OR notice that you are retaining fluid in anyway just like you did before then take an extra dose of your water pill (furosemide/Lasix 20 mg PRN daily) every day until you lose the weight or feel better.     -If you notice that you have taken more than 2 extra doses in 1 week then please call and let us know. -If at any time you feel that you are retaining fluid, your medications are not working, or you feel ill in anyway, then please call us for either same day appointment or the next day, and for instructions. Our goal is to keep you out of the emergency room and the hospital and we have ways to do it. You just need to call us in a timely manner.     -If you become sick for other reasons, and notice that you are not urinating as much, the urine is very dark, you have significant diarrhea or vomiting, then please DO NOT take your water pill and CALL US immediately.

## 2022-10-26 NOTE — TELEPHONE ENCOUNTER
Attempted to contact pt, no answer, left detailed vm.      Electronically signed by Kory Castellano on 10/26/22 at 3:06 PM EDT

## 2022-10-26 NOTE — PROGRESS NOTES
[facility] Cardiology  Office Visit         Reason for Visit: Heart failure    Primary Cardiologist:         History of Present Illness:     Ms. Eunice Cortez is a [de-identified]year old female with a PMHx of cardiomyopathy, chronic HFrEF, anemia with hx of GIB, HTN, HLD, hypothyroidism, dementia. She recently presented to the emergency room with complaints of reported chest pain, fatigue and shortness of breath. She was admitted to the hospital for NSTEMI and acute heart failure in the setting of severe anemia (hemoglobin 5.0). She was treated with PRBC's and gently diuresed. During hospitalization she underwent TTE that demonstrated LV dysfunction 35-40%, stage II DD, preserved RV size and function, mild MR, trace AI. She was initiated on GDMT with plans for further titration as an outpatient. Ischemia evaluation was deferred given patient age, frailty, anemia and dementia. EGD/colonoscopy with no acute bleed. She presents today in hospital follow-up from SNF facility, please note HPI limited given dementia. Since discharge from the hospital she has been compliant with all of her current cardiac medications. She reports good urinary response to oral diuretic. She denies any dizziness, lightheadedness, near-syncope, syncope, strokelike symptoms, shortness of breath, orthopnea, PND, chest pain, pressure, heaviness, palpitations, abdominal bloating, early satiety. She does have bilateral lower extremity edema. Patient Active Problem List    Diagnosis Date Noted    Acute decompensated heart failure ([address] 09/22/2022     Priority: Medium    Acute hypoxemic respiratory failure ([address] 09/22/2022     Priority: Medium    Acute blood loss anemia 09/21/2022     Priority: Medium    Late onset Alzheimer's disease without behavioral disturbance ([address] 11/18/2019     . ..       Left inguinal hernia 11/23/2015    Calculus of gallbladder without cholecystitis without obstruction 10/19/2015    H/O total hysterectomy 10/21/2014 Memory change 10/07/2013    Hyperlipidemia 10/07/2013    Hypertension 10/07/2013    Neuropathy 10/07/2013    Hypothyroid 10/07/2013           Past Medical History:   Diagnosis Date    Alzheimer disease ([facility] [address]     CHF (congestive heart failure) ([facility] [address]     Hyperlipidemia     Hypertension            Past Surgical History:   Procedure Laterality Date    COLONOSCOPY      COLONOSCOPY N/A 9/26/2022    COLONOSCOPY performed by Richard Tang DO at [facility], [place], [address] Left 12/1/2015    femoral    HYSTERECTOMY (CERVIX STATUS UNKNOWN)      JOINT REPLACEMENT Bilateral     UPPER GASTROINTESTINAL ENDOSCOPY N/A 9/23/2022    EGD ESOPHAGOGASTRODUODENOSCOPY performed by Noble Christianson DO at [place] [address]             No Known Allergies      Outpatient Medications Marked as Taking for the 10/26/22 encounter (Office Visit) with SOPHY Jo CNP   Medication Sig Dispense Refill    polyethylene glycol (GLYCOLAX) 17 GM/SCOOP powder Take 17 g by mouth daily as needed      pantoprazole (PROTONIX) 40 MG tablet Take 40 mg by mouth daily      furosemide (LASIX) 20 MG tablet Take 1 tablet by mouth in the morning and 1 tablet in the evening.  60 tablet 3    metoprolol succinate (TOPROL XL) 25 MG extended release tablet Take 1 tablet by mouth daily 30 tablet 3    sacubitril-valsartan (ENTRESTO) 24-26 MG per tablet Take 1 tablet by mouth 2 times daily 60 tablet 0    vitamin B-6 (B-6) 50 MG tablet Take 1 tablet by mouth daily 30 tablet 3    potassium chloride (KLOR-CON M) 20 MEQ extended release tablet Take 1 tablet by mouth daily 30 tablet 0    busPIRone (BUSPAR) 10 MG tablet Take 10 mg by mouth in the morning and at bedtime      donepezil (ARICEPT) 10 MG tablet Take 10 mg by mouth nightly      Calcium Carb-Cholecalciferol (CALCIUM+D3) 600-800 MG-UNIT TABS Take 1 tablet by mouth every morning      DULoxetine (CYMBALTA) 30 MG extended release capsule Take 30 mg by mouth every morning gabapentin (NEURONTIN) 100 MG capsule Take 100 mg by mouth 3 times daily. loperamide (IMODIUM) 2 MG capsule Take 2 mg by mouth three times a week Monday Wednesday Friday      Melatonin 10 MG TABS Take 1 tablet by mouth at bedtime      guaiFENesin 400 MG tablet Take 400 mg by mouth every 12 hours as needed for Cough (Congestion)      memantine (NAMENDA) 10 MG tablet Take 10 mg by mouth 2 times daily      simvastatin (ZOCOR) 40 MG tablet Take 40 mg by mouth nightly      QUEtiapine (SEROQUEL) 12.5 MG TABS Take 12.5 mg by mouth nightly      levothyroxine (SYNTHROID) 75 MCG tablet Take 75 mcg by mouth every morning      Multiple Vitamin (MULTI-DAY PO) Take 1 tablet by mouth every morning      acetaminophen (TYLENOL) 325 MG tablet Take 650 mg by mouth every 6 hours as needed for Pain or Fever      aspirin EC 81 MG EC tablet Take 1 tablet by mouth daily. 30 tablet 3           Guideline directed medical/device therapy:  ARNI/ACE I/ARB: Yes  Beta blocker: Yes  Aldosterone antagonist:  Yes  ICD/CRT-P/-D:   none  QRS interval on recent ECG (personally reviewed/interpreted): <120 ms  Percentage RV pacing (personally reviewed/interpreted): %/NA            Review of Systems:   Cardiac: As per HPI  General: No fever, chills, rigors  Pulmonary: As per HPI  HEENT: No visual disturbances, difficult swallowing  GI: No nausea, vomiting, abdominal pain  : No dysuria or hematuria  Endocrine: No thyroid disease or diabetes  Musculoskeletal: NATARAJAN x 4, no focal motor deficits  Skin: Intact, no rashes  Neuro/Psych: No headache or seizures          Weights:   Wt Readings from Last 3 Encounters:   10/26/22 172 lb 12.8 oz (78.4 kg)   09/21/22 180 lb (81.6 kg)   01/08/20 123 lb (55.8 kg)         Physical Examination:     /62 (Site: Right Upper Arm, Position: Sitting, Cuff Size: Medium Adult)   Resp 16   Ht 5' 8\" (1.727 m)   Wt 172 lb 12.8 oz (78.4 kg)   LMP  (LMP Unknown)   SpO2 95%   BMI 26.27 kg/m²     CONSTITUTIONAL: Alert and oriented times 3, no acute distress and cooperative to examination with proper mood and affect. SKIN: Skin color, texture, turgor normal. No rashes or lesions. LYMPH: no cervical nodes, no inguinal nodes  HEENT: Head is normocephalic, atraumatic. EOMI, PERRLA. NECK: Supple, symmetrical, trachea midline, no adenopathy, thyroid symmetric, not enlarged and no tenderness, skin normal.  CHEST/LUNGS: chest symmetric with normal A/P diameter, normal respiratory rate and rhythm, lungs clear to auscultation without wheezes, rales or rhonchi. No accessory muscle use. Scars None   CARDIOVASCULAR: Heart sounds are normal.  Regular rate and rhythm without murmur, gallop or rub. Normal S1 and S2. . Carotid and femoral pulses 2+/4 and equal bilaterally. ABDOMEN: Normal shape. No and Laparoscopic scar(s) present. Normal bowel sounds. No bruits. soft, nondistended, no masses or organomegaly. no evidence of hernia. Percussion: Normal without hepatosplenomegally. Tenderness: absent. RECTAL: deferred, not clinically indicated  NEUROLOGIC: There are no focalizing motor or sensory deficits. CN II-XII are grossly intact. Gwen Ruffing EXTREMITIES: no cyanosis, no clubbing. 2+ bilateral lower extremity edema. All the following diagnostics were personally reviewed and interpreted by me.        LAB DATA:     9/25/22 05:51 9/26/22 05:52 9/27/22 05:54   Sodium 138 143 140   Potassium 3.9 3.6 3.4 (L)   Chloride 101 105 103   CO2 29 26 26   BUN,BUNPL 18 9 11   Creatinine 0.8 0.7 0.7   Anion Gap 8 12 11   GFR Non- >60 >60 >60   GFR  >60 >60 >60   Glucose, Random 101 (H) 94 102 (H)   CALCIUM, SERUM, 698673 9.0 9.4 8.9   Total Protein 6.5 6.2 (L) 6.3 (L)   Troponin, High Sensitivity      Albumin 3.7 3.4 (L) 3.5   Alk Phos 143 (H) 150 (H) 149 (H)   ALT 12 10 12   AST 24 21 22   Bilirubin 0.7 0.6 0.5   WBC 11.3 8.4 8.6   RBC 4.18 4.12 4.20   Hemoglobin Quant 8.5 (L) 8.5 (L) 8.6 (L)   Hematocrit 30.4 (L) 30.5 (L) 31.0 (L)   MCV 72.7 (L) 74.0 (L) 73.8 (L)   MCH 20.3 (L) 20.6 (L) 20.5 (L)   MCHC 28.0 (L) 27.9 (L) 27.7 (L)   MPV 9.9 9.9 9.8   RDW 27.8 (H) 28.9 (H) 29.7 (H)   Platelet Count [phone]       IMAGING:    CXR (9/21/2022)  FINDINGS:  Heart size is normal.  There is diffuse interstitial and parenchymal  scarring. There are no acute infiltrates or effusions. Impression:  No acute process      CARDIAC TESTING:    TTE (9/22/2022)   Summary   op normal left ventricular chamber size. Moderate global hypokinesis. Visually estimated LVEF is 35-40%. Regional variations in wall motion seen   Grade II diastolic dysfunction with elevated LA pressure. Normal right ventricle structure and function. The left atrium is mildly dilated. Normal right atrial size   Mild mitral regurgitation is present. Unable to estimate PA pressure. Elevated Bi-atrial pressures. No comparison study available. EKG  Sinus Rhythm   Nonspecific T-abnormality      ASSESSMENT:  Chronic HFrEF  ACC stage C / NYHA class III  Near euvolemic with dependent lower extremity edema  Cardiomyopathy, unknown etiology. Possible ischemic. Medical management given comorbidities (severe anemia), frailty and dementia. LVEF 35-40%, LVEDD 5.6, LVMI 113  Preserved RV size and function  HTN  HLD  Anemia/recent GIB  Hypothyroidism  Anxiety  Dementia  Limited CODE STATUS      PLAN:  Change lasix from 20 mg twice daily to 40 mg once daily     2. Stop potassium supplement     3. Please ensure that she is taking spironolactone 12.5 mg daily     4. Start Jardiance 10 mg daily     5. Follow up BMP/BNP in 1 week and fax to cardiology office    6. Weight patient daily     7. Apply compression stockings to lower leg, on in AM and off in PM    8. Follow up with Dr. Claribel Llamas in 2-3 months     9.  Weigh yourself daily    -Stay Hydrated, 64 oz     -Diet should sodium restricted to 2 grams    -Again watch your daily weight trends and if you gain water weight please follow below instructions.    -If you gain 3-5 pounds in 2-3 days OR notice that you are retaining fluid in anyway just like you did before then take an extra dose of your water pill (furosemide/Lasix 20 mg PRN daily) every day until you lose the weight or feel better.     -If you notice that you have taken more than 2 extra doses in 1 week then please call and let us know. -If at any time you feel that you are retaining fluid, your medications are not working, or you feel ill in anyway, then please call us for either same day appointment or the next day, and for instructions. Our goal is to keep you out of the emergency room and the hospital and we have ways to do it. You just need to call us in a timely manner.     -If you become sick for other reasons, and notice that you are not urinating as much, the urine is very dark, you have significant diarrhea or vomiting, then please DO NOT take your water pill and CALL US immediately.      Patricia BECKETT-CNP  [facility] Cardiology

## 2022-10-31 NOTE — TELEPHONE ENCOUNTER
Attempted to contact pt, no answer, left detailed vm.      Electronically signed by Doris Yuen on 10/31/22 at 12:09 PM EDT

## 2025-05-15 ENCOUNTER — APPOINTMENT (OUTPATIENT)
Dept: GENERAL RADIOLOGY | Age: 85
End: 2025-05-15
Payer: COMMERCIAL

## 2025-05-15 ENCOUNTER — HOSPITAL ENCOUNTER (EMERGENCY)
Age: 85
Discharge: HOME | End: 2025-05-15
Attending: EMERGENCY MEDICINE
Payer: COMMERCIAL

## 2025-05-15 VITALS
WEIGHT: 160 LBS | RESPIRATION RATE: 26 BRPM | BODY MASS INDEX: 30.21 KG/M2 | OXYGEN SATURATION: 95 % | DIASTOLIC BLOOD PRESSURE: 66 MMHG | HEIGHT: 61 IN | TEMPERATURE: 98.1 F | SYSTOLIC BLOOD PRESSURE: 121 MMHG | HEART RATE: 74 BPM

## 2025-05-15 DIAGNOSIS — Z71.1 FEARED CONDITION NOT DEMONSTRATED: Primary | ICD-10-CM

## 2025-05-15 LAB
ANION GAP SERPL CALCULATED.3IONS-SCNC: 10 MMOL/L (ref 7–16)
BNP SERPL-MCNC: 156 PG/ML (ref 0–450)
BUN SERPL-MCNC: 12 MG/DL (ref 6–23)
CALCIUM SERPL-MCNC: 9.2 MG/DL (ref 8.6–10.2)
CHLORIDE SERPL-SCNC: 101 MMOL/L (ref 98–107)
CO2 SERPL-SCNC: 29 MMOL/L (ref 22–29)
CREAT SERPL-MCNC: 0.9 MG/DL (ref 0.5–1)
EKG ATRIAL RATE: 67 BPM
EKG P-R INTERVAL: 184 MS
EKG Q-T INTERVAL: 408 MS
EKG QRS DURATION: 66 MS
EKG QTC CALCULATION (BAZETT): 431 MS
EKG R AXIS: 2 DEGREES
EKG T AXIS: 28 DEGREES
EKG VENTRICULAR RATE: 67 BPM
ERYTHROCYTE [DISTWIDTH] IN BLOOD BY AUTOMATED COUNT: 13 % (ref 11.5–15)
GFR, ESTIMATED: 67 ML/MIN/1.73M2
GLUCOSE SERPL-MCNC: 100 MG/DL (ref 74–99)
HCT VFR BLD AUTO: 43.8 % (ref 34–48)
HGB BLD-MCNC: 14 G/DL (ref 11.5–15.5)
MCH RBC QN AUTO: 31.9 PG (ref 26–35)
MCHC RBC AUTO-ENTMCNC: 32 G/DL (ref 32–34.5)
MCV RBC AUTO: 99.8 FL (ref 80–99.9)
PLATELET # BLD AUTO: 166 K/UL (ref 130–450)
PMV BLD AUTO: 10.1 FL (ref 7–12)
POTASSIUM SERPL-SCNC: 3.7 MMOL/L (ref 3.5–5)
RBC # BLD AUTO: 4.39 M/UL (ref 3.5–5.5)
SODIUM SERPL-SCNC: 140 MMOL/L (ref 132–146)
TROPONIN I SERPL HS-MCNC: 13 NG/L (ref 0–14)
TROPONIN I SERPL HS-MCNC: 13 NG/L (ref 0–14)
WBC OTHER # BLD: 5.9 K/UL (ref 4.5–11.5)

## 2025-05-15 PROCEDURE — 93005 ELECTROCARDIOGRAM TRACING: CPT | Performed by: EMERGENCY MEDICINE

## 2025-05-15 PROCEDURE — 94664 DEMO&/EVAL PT USE INHALER: CPT

## 2025-05-15 PROCEDURE — 2500000003 HC RX 250 WO HCPCS: Performed by: EMERGENCY MEDICINE

## 2025-05-15 PROCEDURE — 96374 THER/PROPH/DIAG INJ IV PUSH: CPT

## 2025-05-15 PROCEDURE — 71045 X-RAY EXAM CHEST 1 VIEW: CPT

## 2025-05-15 PROCEDURE — 99285 EMERGENCY DEPT VISIT HI MDM: CPT

## 2025-05-15 PROCEDURE — 83880 ASSAY OF NATRIURETIC PEPTIDE: CPT

## 2025-05-15 PROCEDURE — 6370000000 HC RX 637 (ALT 250 FOR IP): Performed by: EMERGENCY MEDICINE

## 2025-05-15 PROCEDURE — 85027 COMPLETE CBC AUTOMATED: CPT

## 2025-05-15 PROCEDURE — 6360000002 HC RX W HCPCS: Performed by: EMERGENCY MEDICINE

## 2025-05-15 PROCEDURE — 93010 ELECTROCARDIOGRAM REPORT: CPT | Performed by: INTERNAL MEDICINE

## 2025-05-15 PROCEDURE — 80048 BASIC METABOLIC PNL TOTAL CA: CPT

## 2025-05-15 PROCEDURE — 84484 ASSAY OF TROPONIN QUANT: CPT

## 2025-05-15 RX ORDER — IPRATROPIUM BROMIDE AND ALBUTEROL SULFATE 2.5; .5 MG/3ML; MG/3ML
1 SOLUTION RESPIRATORY (INHALATION) ONCE
Status: COMPLETED | OUTPATIENT
Start: 2025-05-15 | End: 2025-05-15

## 2025-05-15 RX ADMIN — IPRATROPIUM BROMIDE AND ALBUTEROL SULFATE 1 DOSE: .5; 2.5 SOLUTION RESPIRATORY (INHALATION) at 03:59

## 2025-05-15 RX ADMIN — WATER 125 MG: 1 INJECTION INTRAMUSCULAR; INTRAVENOUS; SUBCUTANEOUS at 03:21

## 2025-05-15 ASSESSMENT — PAIN - FUNCTIONAL ASSESSMENT: PAIN_FUNCTIONAL_ASSESSMENT: NONE - DENIES PAIN

## 2025-05-15 ASSESSMENT — ENCOUNTER SYMPTOMS
EYE PAIN: 0
BACK PAIN: 0
NAUSEA: 0
VOMITING: 0
SHORTNESS OF BREATH: 1
COUGH: 0
EYE REDNESS: 0
DIARRHEA: 0
EYE DISCHARGE: 0
SINUS PRESSURE: 0
WHEEZING: 1
SORE THROAT: 0
ABDOMINAL DISTENTION: 0

## 2025-05-15 ASSESSMENT — LIFESTYLE VARIABLES
HOW MANY STANDARD DRINKS CONTAINING ALCOHOL DO YOU HAVE ON A TYPICAL DAY: PATIENT DOES NOT DRINK
HOW OFTEN DO YOU HAVE A DRINK CONTAINING ALCOHOL: NEVER

## 2025-05-15 NOTE — ED NOTES
Attempted to call report at [facility] for the return of the patient due to being discharged. No answer.

## 2025-05-15 NOTE — ED PROVIDER NOTES
Patient is an 86 y/o female who presents to the ED via EMS from the nursing home. EMS reports the nursing home called for hypoxia. Patient reportedly was 85% on room air. She was noted to have wheezing. Patient does admit to mild shortness of breath. She denies any fever or cough. She denies any chest pain. She states that she always wheezes.         Review of Systems   Constitutional:  Negative for chills and fever.   HENT:  Negative for ear pain, sinus pressure and sore throat.    Eyes:  Negative for pain, discharge and redness.   Respiratory:  Positive for shortness of breath and wheezing. Negative for cough.    Cardiovascular:  Negative for chest pain.   Gastrointestinal:  Negative for abdominal distention, diarrhea, nausea and vomiting.   Genitourinary:  Negative for dysuria and frequency.   Musculoskeletal:  Negative for arthralgias and back pain.   Skin:  Negative for rash and wound.   Neurological:  Negative for weakness and headaches.   Hematological:  Negative for adenopathy.   All other systems reviewed and are negative.       Physical Exam  Vitals and nursing note reviewed.   Constitutional:       General: She is not in acute distress.  HENT:      Head: Normocephalic and atraumatic.      Right Ear: External ear normal.      Left Ear: External ear normal.      Nose: Nose normal.      Mouth/Throat:      Mouth: Mucous membranes are moist.   Eyes:      Conjunctiva/sclera: Conjunctivae normal.      Pupils: Pupils are equal, round, and reactive to light.   Cardiovascular:      Rate and Rhythm: Normal rate and regular rhythm.      Heart sounds: No murmur heard.  Pulmonary:      Effort: Pulmonary effort is normal. No respiratory distress.      Breath sounds: No stridor. Wheezing present. No rhonchi or rales.   Abdominal:      General: Bowel sounds are normal. There is no distension.      Palpations: Abdomen is soft.      Tenderness: There is no abdominal tenderness. There is no guarding.   Musculoskeletal:     Surgical History:  has a past surgical history that includes Colonoscopy; Endoscopy, colon, diagnostic; Hysterectomy; joint replacement (Bilateral); hernia repair (Left, 12/1/2015); Upper gastrointestinal endoscopy (N/A, 9/23/2022); and Colonoscopy (N/A, 9/26/2022).    Social History:  reports that she quit smoking about 32 years ago. Her smoking use included cigarettes. She started smoking about 64 years ago. She has a 15.8 pack-year smoking history. She has never used smokeless tobacco. She reports that she does not currently use alcohol. She reports that she does not use drugs.    Family History: family history is not on file.     The patient’s home medications have been reviewed.    Allergies: Patient has no known allergies.    -------------------------------------------------- RESULTS -------------------------------------------------  Labs:  Results for orders placed or performed during the hospital encounter of 05/15/25   Basic metabolic panel   Result Value Ref Range    Sodium 140 132 - 146 mmol/L    Potassium 3.7 3.5 - 5.0 mmol/L    Chloride 101 98 - 107 mmol/L    CO2 29 22 - 29 mmol/L    Anion Gap 10 7 - 16 mmol/L    Glucose 100 (H) 74 - 99 mg/dL    BUN 12 6 - 23 mg/dL    Creatinine 0.9 0.50 - 1.00 mg/dL    Est, Glom Filt Rate 67 >60 mL/min/1.73m2    Calcium 9.2 8.6 - 10.2 mg/dL   CBC   Result Value Ref Range    WBC 5.9 4.5 - 11.5 k/uL    RBC 4.39 3.50 - 5.50 m/uL    Hemoglobin 14.0 11.5 - 15.5 g/dL    Hematocrit 43.8 34.0 - 48.0 %    MCV 99.8 80.0 - 99.9 fL    MCH 31.9 26.0 - 35.0 pg    MCHC 32.0 32.0 - 34.5 g/dL    RDW 13.0 11.5 - 15.0 %    Platelets 166 130 - 450 k/uL    MPV 10.1 7.0 - 12.0 fL   Troponin   Result Value Ref Range    Troponin, High Sensitivity 13 0 - 14 ng/L   Brain Natriuretic Peptide   Result Value Ref Range    NT Pro- 0 - 450 pg/mL   Troponin   Result Value Ref Range    Troponin, High Sensitivity 13 0 - 14 ng/L   EKG 12 Lead   Result Value Ref Range    Ventricular Rate 67 BPM

## 2025-05-29 ENCOUNTER — APPOINTMENT (OUTPATIENT)
Dept: CT IMAGING | Age: 85
End: 2025-05-29
Payer: COMMERCIAL

## 2025-05-29 ENCOUNTER — HOSPITAL ENCOUNTER (EMERGENCY)
Age: 85
Discharge: HOME | End: 2025-05-30
Attending: STUDENT IN AN ORGANIZED HEALTH CARE EDUCATION/TRAINING PROGRAM
Payer: COMMERCIAL

## 2025-05-29 VITALS
DIASTOLIC BLOOD PRESSURE: 65 MMHG | OXYGEN SATURATION: 95 % | RESPIRATION RATE: 18 BRPM | HEART RATE: 59 BPM | TEMPERATURE: 98.4 F | SYSTOLIC BLOOD PRESSURE: 111 MMHG

## 2025-05-29 DIAGNOSIS — S32.000A LUMBAR COMPRESSION FRACTURE, CLOSED, INITIAL ENCOUNTER (HCC): ICD-10-CM
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
## 2025-05-29 DIAGNOSIS — S32.009A CLOSED FRACTURE OF TRANSVERSE PROCESS OF LUMBAR VERTEBRA, INITIAL ENCOUNTER (HCC): ICD-10-CM

## 2025-05-29 PROCEDURE — 72125 CT NECK SPINE W/O DYE: CPT

## 2025-05-29 PROCEDURE — 70450 CT HEAD/BRAIN W/O DYE: CPT

## 2025-05-29 PROCEDURE — 72128 CT CHEST SPINE W/O DYE: CPT

## 2025-05-29 PROCEDURE — 72131 CT LUMBAR SPINE W/O DYE: CPT

## 2025-05-29 PROCEDURE — 99284 EMERGENCY DEPT VISIT MOD MDM: CPT

## 2025-05-29 RX ORDER — HYDROCODONE BITARTRATE AND ACETAMINOPHEN 5; 325 MG/1; MG/1
1 TABLET ORAL EVERY 6 HOURS PRN
Qty: 12 TABLET | Refills: 0 | Status: SHIPPED | OUTPATIENT
Start: 2025-05-29 | End: 2025-06-01

## 2025-05-29 RX ORDER — HYDROCODONE BITARTRATE AND ACETAMINOPHEN 5; 325 MG/1; MG/1
1 TABLET ORAL EVERY 6 HOURS PRN
Qty: 12 TABLET | Refills: 0 | Status: SHIPPED | OUTPATIENT
Start: 2025-05-29 | End: 2025-05-29

## 2025-05-29 NOTE — ED PROVIDER NOTES
Shared [facility]-ED Attending Visit.  CC: No             [facility] EMERGENCY DEPARTMENT  ED  Encounter Note  Admit Date/RoomTime: 2025  7:06 PM  ED Room:   NAME: Brigitte Kincaid  : 1940  MRN: [id number]  PCP: Ronan Clark DO    CHIEF COMPLAINT     Fall (Patient from the [facility]. Unwitnessed fall - unknown LOC or if she hit her head. C/O lower back pain. Given 50mcg fentanyl en route. )    HISTORY OF PRESENT ILLNESS        Brigitte Kincaid is a 85 y.o. female who presents to the ED by ambulance for fall at NH, beginning just prior to arrival. The complaint has been persistent and are moderate in severity.  The patient was sent in from the nursing home after falling.  This was an unwitnessed fall.  She was found on the floor by staff after just a few minutes.  The patient was complaining of low back pain.  She was given 50 mcg of fentanyl by EMS staff prior to arrival.  Her pain is now much improved.  Uncertain if she hit her head.  Patient is not on blood thinners.  According to nursing staff they were notified that the patient is wheelchair-bound and does not ambulate.  The patient has dementia.  When asked what happened she goes into a story about her children      REVIEW OF SYSTEMS     Pertinent positives and negatives are stated within HPI, all other systems reviewed and are negative.    Past Medical History:  has a past medical history of Alzheimer disease (HCC), CHF (congestive heart failure) (HCC), Hyperlipidemia, and Hypertension.  Surgical History:  has a past surgical history that includes Colonoscopy; Endoscopy, colon, diagnostic; Hysterectomy; joint replacement (Bilateral); hernia repair (Left, 2015); Upper gastrointestinal endoscopy (N/A, 2022); and Colonoscopy (N/A, 2022).  Social History:  reports that she quit smoking about 32 years ago. Her smoking use included cigarettes. She started smoking about 64 years ago. She has  15, no focal deficits, symmetric strength 5/5 in the upper and lower extremities bilaterally  Psychiatric: Normal Affect    DIAGNOSTIC RESULTS   (All laboratory and radiology results have been personally reviewed by myself)  Labs:  No results found for this visit on 05/29/25.  Imaging:  All Radiology results interpreted by Radiologist unless otherwise noted.  CT HEAD WO CONTRAST   Final Result   1. No acute intracranial abnormality.  No sign of closed head injury.   2. Moderate age-appropriate atrophy and prominent small vessel ischemia.         CT CERVICAL SPINE WO CONTRAST   Final Result   1. No acute fracture or traumatic malalignment of the cervical spine.   2. Severe disc degenerative disease from C4 through C7.   3. Grade 1 anterior subluxation of C2 on C3 which is degenerative, associated   with moderate left and mild right facet arthropathy.         CT THORACIC SPINE WO CONTRAST   Final Result   1. Mild T11 compression fracture of indeterminate age. There is no   paraspinous soft tissue swelling to suggest acute fracture.   2. Moderate hiatal hernia.         CT LUMBAR SPINE WO CONTRAST   Final Result   1. Mild L3 compression fracture and moderate inferior L3 endplate fracture of   indeterminate age.   2. Mild superior and inferior L2 endplate fractures of indeterminate age.   3. Fracture of the right L3 transverse process with mild distraction of   fracture fragments. This is of indeterminate age.   4. Severe spinal stenosis at L2-3. Mild spinal stenosis at L3-4 and L4-5.   5. Grade 1 anterior subluxation of L4 on L5 related to mild bilateral facet   arthropathy.             ED COURSE   Vitals:    Vitals:    05/29/25 1839   BP: 111/65   Pulse: 59   Resp: 18   Temp: 98.4 °F (36.9 °C)   TempSrc: Oral   SpO2: 95%       Patient was given the following medications:  Medications - No data to display         CONSULTS:  IP CONSULT TO PRIMARY CARE PROVIDER  DIFFERENTIAL DX_MDM   MDM:   Social Determinants :

## (undated) DEVICE — Device: Brand: DEFENDO VALVE AND CONNECTOR KIT

## (undated) DEVICE — MASK,FACE,MAXFLUIDPROTECT,SHIELD/ERLPS: Brand: MEDLINE

## (undated) DEVICE — KENDALL 450 SERIES MONITORING FOAM ELECTRODE - RECTANGULAR SHAPE ( 3/PK): Brand: KENDALL

## (undated) DEVICE — COVER,LIGHT HANDLE,FLX,1/PK: Brand: MEDLINE INDUSTRIES, INC.

## (undated) DEVICE — GOWN ISOLATN REG YEL M WT MULTIPLY SIDETIE LEV 2

## (undated) DEVICE — KIT BEDSIDE REVITAL OX 500ML

## (undated) DEVICE — LUBRICANT SURG JELLY ST BACTER TUBE 4.25OZ

## (undated) DEVICE — BLOCK BITE 60FR CAREGUARD

## (undated) DEVICE — SPONGE GZ 4IN 4IN 4 PLY N WVN AVANT

## (undated) DEVICE — FORCEPS BX L240CM JAW DIA2.8MM L CAP W/ NDL MIC MESH TOOTH

## (undated) DEVICE — CONTAINER SPEC COLL 960ML POLYPR TRIANG GRAD INTAKE/OUTPUT

## (undated) DEVICE — 6 X 9  1.75MIL 4-WALL LABGUARD: Brand: MINIGRIP COMMERCIAL LLC

## (undated) DEVICE — TOWEL,OR,DSP,ST,BLUE,STD,6/PK,12PK/CS: Brand: MEDLINE

## (undated) DEVICE — YANKAUER,BULB TIP,W/O VENT,RIGID,STERILE: Brand: MEDLINE

## (undated) DEVICE — TUBING, SUCTION, 1/4" X 10', STRAIGHT: Brand: MEDLINE